# Patient Record
Sex: FEMALE | Race: WHITE | Employment: UNEMPLOYED | URBAN - METROPOLITAN AREA
[De-identification: names, ages, dates, MRNs, and addresses within clinical notes are randomized per-mention and may not be internally consistent; named-entity substitution may affect disease eponyms.]

---

## 2018-01-10 DIAGNOSIS — Z00.00 ROUTINE GENERAL MEDICAL EXAMINATION AT A HEALTH CARE FACILITY: ICD-10-CM

## 2018-01-10 NOTE — TELEPHONE ENCOUNTER
"Requested Prescriptions   Pending Prescriptions Disp Refills     norethindrone-ethinyl estradiol (JUNEL FE 1/20) 1-20 MG-MCG per tablet    Last Written Prescription Date:  11/11/2016  Last Fill Quantity: 84,  # refills: 4   Last Office Visit with [facility], P or [facility] prescribing provider:  10/14/2016   Future Office Visit:      84 tablet 4     Sig: Take 1 tablet by mouth daily    Contraceptives Protocol Failed    1/10/2018 12:18 PM       Failed - Recent or future visit with authorizing provider's specialty    Patient had office visit in the last year or has a visit in the next 30 days with authorizing provider.  See \"Patient Info\" tab in inbasket, or \"Choose Columns\" in Meds & Orders section of the refill encounter.              Passed - Patient is not a current smoker if age is 35 or older       Passed - No active pregnancy on record       Passed - No positive pregnancy test in past 12 months          "

## 2018-01-14 DIAGNOSIS — Z00.00 ROUTINE GENERAL MEDICAL EXAMINATION AT A HEALTH CARE FACILITY: ICD-10-CM

## 2018-01-15 NOTE — TELEPHONE ENCOUNTER
"Requested Prescriptions   Pending Prescriptions Disp Refills     norethindrone-ethinyl estradiol (JUNEL FE 1/20) 1-20 MG-MCG per tablet  Last Written Prescription Date:  11/11/16  Last Fill Quantity: 84,  # refills: 4   Last Office Visit with [facility], P or [facility] prescribing provider:  10/14/2016     Future Office Visit:      84 tablet 4     Sig: Take 1 tablet by mouth daily    Contraceptives Protocol Failed    1/14/2018  4:41 PM       Failed - Recent or future visit with authorizing provider's specialty    Patient had office visit in the last year or has a visit in the next 30 days with authorizing provider.  See \"Patient Info\" tab in inbasket, or \"Choose Columns\" in Meds & Orders section of the refill encounter.              Passed - Patient is not a current smoker if age is 35 or older       Passed - No active pregnancy on record       Passed - No positive pregnancy test in past 12 months          "

## 2018-01-16 RX ORDER — NORETHINDRONE ACETATE AND ETHINYL ESTRADIOL 1MG-20(21)
1 KIT ORAL DAILY
Qty: 28 TABLET | Refills: 0 | Status: SHIPPED | OUTPATIENT
Start: 2018-01-16 | End: 2020-05-22

## 2018-01-16 NOTE — TELEPHONE ENCOUNTER
Refilled x 1 month, due for an annual physical. Note sent to pharmacy.  Please call her to schedule this.   Susan Pritchett, NAY  Triage Nurse

## 2018-01-17 RX ORDER — NORETHINDRONE ACETATE AND ETHINYL ESTRADIOL 1MG-20(21)
1 KIT ORAL DAILY
Qty: 84 TABLET | Refills: 4 | OUTPATIENT
Start: 2018-01-17

## 2018-02-07 ENCOUNTER — OFFICE VISIT (OUTPATIENT)
Dept: PEDIATRICS | Facility: CLINIC | Age: 48
End: 2018-02-07
Payer: COMMERCIAL

## 2018-02-07 VITALS
BODY MASS INDEX: 23.52 KG/M2 | HEART RATE: 72 BPM | WEIGHT: 127.8 LBS | RESPIRATION RATE: 12 BRPM | TEMPERATURE: 98.1 F | HEIGHT: 62 IN | SYSTOLIC BLOOD PRESSURE: 102 MMHG | DIASTOLIC BLOOD PRESSURE: 62 MMHG | OXYGEN SATURATION: 98 %

## 2018-02-07 DIAGNOSIS — Z30.41 ENCOUNTER FOR SURVEILLANCE OF CONTRACEPTIVE PILLS: ICD-10-CM

## 2018-02-07 DIAGNOSIS — Z11.51 SCREENING FOR HUMAN PAPILLOMAVIRUS: ICD-10-CM

## 2018-02-07 DIAGNOSIS — R63.5 ABNORMAL WEIGHT GAIN: ICD-10-CM

## 2018-02-07 DIAGNOSIS — J01.90 ACUTE SINUSITIS WITH SYMPTOMS GREATER THAN 10 DAYS: ICD-10-CM

## 2018-02-07 DIAGNOSIS — Z00.00 ROUTINE GENERAL MEDICAL EXAMINATION AT A HEALTH CARE FACILITY: Primary | ICD-10-CM

## 2018-02-07 DIAGNOSIS — Z12.4 SCREENING FOR CERVICAL CANCER: ICD-10-CM

## 2018-02-07 LAB
T4 FREE SERPL-MCNC: 1.05 NG/DL (ref 0.76–1.46)
TSH SERPL DL<=0.005 MIU/L-ACNC: 1.75 MU/L (ref 0.4–4)

## 2018-02-07 PROCEDURE — 36415 COLL VENOUS BLD VENIPUNCTURE: CPT | Performed by: INTERNAL MEDICINE

## 2018-02-07 PROCEDURE — 99396 PREV VISIT EST AGE 40-64: CPT | Performed by: INTERNAL MEDICINE

## 2018-02-07 PROCEDURE — G0145 SCR C/V CYTO,THINLAYER,RESCR: HCPCS | Performed by: INTERNAL MEDICINE

## 2018-02-07 PROCEDURE — 84439 ASSAY OF FREE THYROXINE: CPT | Performed by: INTERNAL MEDICINE

## 2018-02-07 PROCEDURE — 87624 HPV HI-RISK TYP POOLED RSLT: CPT | Performed by: INTERNAL MEDICINE

## 2018-02-07 PROCEDURE — 84443 ASSAY THYROID STIM HORMONE: CPT | Performed by: INTERNAL MEDICINE

## 2018-02-07 PROCEDURE — 99214 OFFICE O/P EST MOD 30 MIN: CPT | Performed by: INTERNAL MEDICINE

## 2018-02-07 RX ORDER — ESTAZOLAM 2 MG/1
1 TABLET ORAL DAILY
Qty: 90 TABLET | Refills: 3 | Status: SHIPPED | OUTPATIENT
Start: 2018-02-07 | End: 2019-02-24

## 2018-02-07 NOTE — NURSING NOTE
"Chief Complaint   Patient presents with     Physical       Initial /62 (BP Location: Right arm, Patient Position: Chair, Cuff Size: Adult Regular)  Pulse 72  Temp 98.1  F (36.7  C) (Tympanic)  Resp 12  Ht 5' 2\" (1.575 m)  Wt 127 lb 12.8 oz (58 kg)  SpO2 98%  BMI 23.37 kg/m2 Estimated body mass index is 23.37 kg/(m^2) as calculated from the following:    Height as of this encounter: 5' 2\" (1.575 m).    Weight as of this encounter: 127 lb 12.8 oz (58 kg).  Medication Reconciliation: complete  Chelsey Montelongo CMA  "

## 2018-02-07 NOTE — MR AVS SNAPSHOT
After Visit Summary   2/7/2018    Carrie Germain    MRN: [id number]           Patient Information     Date Of Birth          1970        Visit Information        Provider Department      2/7/2018 9:00 AM Naomi Rosen MD [facility] [place]        Today's Diagnoses     Routine general medical examination at a health care facility    -  1    Acute sinusitis with symptoms greater than 10 days        Screening for cervical cancer        Screening for human papillomavirus        Encounter for surveillance of contraceptive pills        Abnormal weight gain          Care Instructions    You can take Benadryl if you have a bad flare up; this will make you sleepy. If this still causes you problems when the temperature warms up, then let me know.     Watch portion sizes. Eat a healthy, balanced diet that consists of 1/2 plate of fruits and vegetables, 1/4 plate complex carbohydrate and 1/4 plate lean protein.     You can go to a dietician, but it will not be covered under insurance. You are able to use HSA dollars for that.     Follow up for annual care or as needed       Preventive Health Recommendations  Female Ages 40 to 49    Yearly exam:     See your health care provider every year in order to  1. Review health changes.   2. Discuss preventive care.    3. Review your medicines if your doctor prescribed any.      Get a Pap test every three years (unless you have an abnormal result and your provider advises testing more often).      If you get Pap tests with HPV test, you only need to test every 5 years, unless you have an abnormal result. You do not need a Pap test if your uterus was removed (hysterectomy) and you have not had cancer.      You should be tested each year for STDs (sexually transmitted diseases), if you're at risk.       Ask your doctor if you should have a mammogram.      Have a colonoscopy (test for colon cancer) if someone in your family has had colon cancer or polyps  before age 50.       Have a cholesterol test every 5 years.       Have a diabetes test (fasting glucose) after age 45. If you are at risk for diabetes, you should have this test every 3 years.    Shots: Get a flu shot each year. Get a tetanus shot every 10 years.     Nutrition:     Eat at least 5 servings of fruits and vegetables each day.    Eat whole-grain bread, whole-wheat pasta and brown rice instead of white grains and rice.    Talk to your provider about Calcium and Vitamin D.     Lifestyle    Exercise at least 150 minutes a week (an average of 30 minutes a day, 5 days a week). This will help you control your weight and prevent disease.    Limit alcohol to one drink per day.    No smoking.     Wear sunscreen to prevent skin cancer.    See your dentist every six months for an exam and cleaning.          Follow-ups after your visit        Who to contact     If you have questions or need follow up information about today's clinic visit or your schedule please contact [facility] directly at [phone].  Normal or non-critical lab and imaging results will be communicated to you by [employer], letter or phone within 4 business days after the clinic has received the results. If you do not hear from us within 7 days, please contact the clinic through "[employer]" or phone. If you have a critical or abnormal lab result, we will notify you by phone as soon as possible.  Submit refill requests through "[employer]" or call your pharmacy and they will forward the refill request to us. Please allow 3 business days for your refill to be completed.          Additional Information About Your Visit        "[employer]" Information     "[employer]" gives you secure access to your electronic health record. If you see a primary care provider, you can also send messages to your care team and make appointments. If you have questions, please call your primary care clinic.  If you do not have a primary care provider, please call [phone]  "and they will assist you.        Care EveryWhere ID     This is your Care EveryWhere ID. This could be used by other organizations to access your [facility] medical records  [id number]        Your Vitals Were     Pulse Temperature Respirations Height Pulse Oximetry BMI (Body Mass Index)    72 98.1  F (36.7  C) (Tympanic) 12 5' 2\" (1.575 m) 98% 23.37 kg/m2       Blood Pressure from Last 3 Encounters:   02/07/18 102/62   10/14/16 100/70   09/13/16 90/52    Weight from Last 3 Encounters:   02/07/18 127 lb 12.8 oz (58 kg)   10/14/16 122 lb (55.3 kg)   09/13/16 121 lb 1.6 oz (54.9 kg)              We Performed the Following     HPV High Risk Types DNA Cervical     Pap imaged thin layer screen with HPV - recommended age 30 - 65     T4 FREE     TSH          Today's Medication Changes          These changes are accurate as of 2/7/18  9:44 AM.  If you have any questions, ask your nurse or doctor.               Start taking these medicines.        Dose/Directions    amoxicillin-clavulanate 875-125 MG per tablet   Commonly known as:  AUGMENTIN   Used for:  Acute sinusitis with symptoms greater than 10 days   Started by:  Naomi Rosen MD        Dose:  1 tablet   Take 1 tablet by mouth 2 times daily   Quantity:  20 tablet   Refills:  0       MICROGESTIN 1-20 MG-MCG per tablet   Used for:  Encounter for surveillance of contraceptive pills   Generic drug:  norethindrone-ethinyl estradiol   Started by:  Naomi Rosen MD        Dose:  1 tablet   Take 1 tablet by mouth daily   Quantity:  90 tablet   Refills:  3            Where to get your medicines      These medications were sent to [id number] - [place] - [address]  [address], [place] [address]     Phone:  [phone]     MICROGESTIN 1-20 MG-MCG per tablet         Some of these will need a paper prescription and others can be bought over the counter.  Ask your nurse if you have " questions.     Bring a paper prescription for each of these medications     amoxicillin-clavulanate 875-125 MG per tablet                Primary Care Provider Office Phone # Fax #    Naomi Rosen [phone] [address] [place] [address]        Equal Access to Services     [facility]: Hadii aad ku hadasho Soomaali, waaxda luqadaha, qaybta kaalmada adeegyada, waxay ambikain hayalecian daysi delvallehanypolina gardner. So [facility] [phone].    ATENCIÓN: Si habla español, tiene a patel disposición servicios gratuitos de asistencia lingüística. [facility] [phone].    We comply with applicable federal civil rights laws and [place] laws. We do not discriminate on the basis of race, color, national origin, age, disability, sex, sexual orientation, or gender identity.            Thank you!     Thank you for choosing [facility] [place]  for your care. Our goal is always to provide you with excellent care. Hearing back from our patients is one way we can continue to improve our services. Please take a few minutes to complete the written survey that you may receive in the mail after your visit with us. Thank you!             Your Updated Medication List - Protect others around you: Learn how to safely use, store and throw away your medicines at www.disposemymeds.org.          This list is accurate as of 2/7/18  9:44 AM.  Always use your most recent med list.                   Brand Name Dispense Instructions for use Diagnosis    amoxicillin-clavulanate 875-125 MG per tablet    AUGMENTIN    20 tablet    Take 1 tablet by mouth 2 times daily    Acute sinusitis with symptoms greater than 10 days       cetirizine 10 MG tablet    zyrTEC    30 tablet    Take 1 tablet (10 mg) by mouth every evening        MICROGESTIN 1-20 MG-MCG per tablet   Generic drug:  norethindrone-ethinyl estradiol     90 tablet    Take 1 tablet by mouth daily    Encounter for surveillance of contraceptive pills        norethindrone-ethinyl estradiol 1-20 MG-MCG per tablet    JUNEL FE 1/20    28 tablet    Take 1 tablet by mouth daily    Routine general medical examination at a health care facility

## 2018-02-07 NOTE — PROGRESS NOTES
"   SUBJECTIVE:   CC: Carrie Germain is an 47 year old woman who presents for preventive health visit.     Carrie presents to the clinic for her annual physical examination. Reports she has a cold which transitioned into chest congestion, cough and chills. Sx have lasted 8 days and patient reports she is staying the same. Endorses facial pain/pressure and shortness of breath, but denies rhinorrhea . Sx wax and wane day by day.     Patient reports she was out of birth control and was given a different birth control without microgestin the past. She reported with this past pill pack she was given a different pill and \"felt like I was pregnant\".  She wishes to use microgestin only.      Patient states she has skin redness and itching with scratching or cold that  Zyrtec  has helped in the past. She reports the Zyrtec has not been helping as much in the last month.     BP in clinic was 102/62; weight was 127 lbs. Notes she has been exercising on the treadmill and aerobics 2-4x a week and does eats healthy; she is wondering if there are thyroid issues.     Physical   Annual:     Getting at least 3 servings of Calcium per day::  Yes    Bi-annual eye exam::  Yes    Dental care twice a year::  Yes    Sleep apnea or symptoms of sleep apnea::  None    Diet::  Regular (no restrictions)    Frequency of exercise::  2-3 days/week    Duration of exercise::  Greater than 60 minutes    Taking medications regularly::  No    Barriers to taking medications::  Other (Last refill of birth control pills she got a different kind than she had been getting and would like to note to the pharmacy that she only wants \"microgestin\" )    Medication side effects::  None    Additional concerns today::  YES (is on day 8 of a cold.)              RESPIRATORY SYMPTOMS    Duration: 8 days    Description  nasal congestion, rhinorrhea, sore throat, facial pain/pressure, cough, wheezing, chills, headache and hoarse voice after coughing a lot, fatigue is " very bad    Severity: moderate    Accompanying signs and symptoms: None    History (predisposing factors):  Has been sick on and off for months, passing URI back and forth to     Precipitating or alleviating factors: None    Therapies tried and outcome:  nyquil at night to help with coughing/sleep      Today's PHQ-2 Score:   PHQ-2 ( 1999 Pfizer) 2/7/2018   Q1: Little interest or pleasure in doing things 0   Q2: Feeling down, depressed or hopeless 0   PHQ-2 Score 0   Q1: Little interest or pleasure in doing things Not at all   Q2: Feeling down, depressed or hopeless Not at all   PHQ-2 Score 0       Abuse: Current or Past(Physical, Sexual or Emotional)- No  Do you feel safe in your environment - Yes    Social History   Substance Use Topics     Smoking status: Never Smoker     Smokeless tobacco: Never Used     Alcohol use Yes     Alcohol Use 2/7/2018   If you drink alcohol, do you typically have greater than 3 drinks per day OR greater than 7 drinks per week?   No       Reviewed orders with patient.  Reviewed health maintenance and updated orders accordingly - Yes  Labs reviewed in EPIC  BP Readings from Last 3 Encounters:   02/07/18 102/62   10/14/16 100/70   09/13/16 90/52    Wt Readings from Last 3 Encounters:   02/07/18 58 kg (127 lb 12.8 oz)   10/14/16 55.3 kg (122 lb)   09/13/16 54.9 kg (121 lb 1.6 oz)                  There is no problem list on file for this patient.    Past Surgical History:   Procedure Laterality Date     LEEP TX, CERVICAL      1987       Social History   Substance Use Topics     Smoking status: Never Smoker     Smokeless tobacco: Never Used     Alcohol use Yes     Family History   Problem Relation Age of Onset     Hypertension Mother      Hyperlipidemia Mother      Prostate Cancer Father      Breast Cancer Maternal Grandmother      50's     Coronary Artery Disease Paternal Grandmother      50's     Other Cancer Other      bone cancer     Hyperlipidemia Sister      Hypertension Sister   "        Current Outpatient Prescriptions   Medication Sig Dispense Refill     norethindrone-ethinyl estradiol (JUNEL FE 1/20) 1-20 MG-MCG per tablet Take 1 tablet by mouth daily 28 tablet 0     cetirizine (ZYRTEC) 10 MG tablet Take 1 tablet (10 mg) by mouth every evening 30 tablet 1     Allergies   Allergen Reactions     Doxycycline Palpitations     Hives          Patient under age 50, mutual decision reflected in health maintenance.      Pertinent mammograms are reviewed under the imaging tab.  History of abnormal Pap smear: Last 3 Pap Results: No results found for: PAP    Reviewed and updated as needed this visit by clinical staff         Reviewed and updated as needed this visit by Provider            Review of Systems  C: NEGATIVE for fever, chills, change in weight  I: NEGATIVE for worrisome rashes, moles or lesions  E: NEGATIVE for vision changes or irritation  ENT: NEGATIVE for ear, mouth and throat problems  R: NEGATIVE for significant cough or SOB  B: NEGATIVE for masses, tenderness or discharge  CV: NEGATIVE for chest pain, palpitations or peripheral edema  GI: NEGATIVE for nausea, abdominal pain, heartburn, or change in bowel habits  : NEGATIVE for unusual urinary or vaginal symptoms. Periods are regular.  M: NEGATIVE for significant arthralgias or myalgia  N: NEGATIVE for weakness, dizziness or paresthesias  P: NEGATIVE for changes in mood or affect     This document serves as a record of the services and decisions personally performed and made by Naomi Rosen MD. It was created on her behalf by Alicia Napoles, a trained medical scribe. The creation of this document is based the provider's statements to the medical scribe.    Alicia Napoles February 7, 2018 9:23 AM  OBJECTIVE:   /62 (BP Location: Right arm, Patient Position: Chair, Cuff Size: Adult Regular)  Pulse 72  Temp 98.1  F (36.7  C) (Tympanic)  Resp 12  Ht 5' 2\" (1.575 m)  Wt 127 lb 12.8 oz (58 kg)  SpO2 98%  BMI 23.37 " kg/m2  Physical Exam  GENERAL: healthy, alert and no distress  EYES: Eyes grossly normal to inspection, PERRL and conjunctivae and sclerae normal  HENT: ear canals and TM's normal, nose and mouth without ulcers or lesions  NECK: no adenopathy, no asymmetry, masses, or scars and thyroid normal to palpation  RESP: lungs clear to auscultation - no rales, rhonchi or wheezes  BREAST: normal without masses, tenderness or nipple discharge and no palpable axillary masses or adenopathy  CV: regular rate and rhythm, normal S1 S2, no S3 or S4, no murmur, click or rub, no peripheral edema   ABDOMEN: soft, nontender, no hepatosplenomegaly, no masses and bowel sounds normal   (female): normal female external genitalia, normal urethral meatus, vaginal mucosa pink, moist, well rugated, and normal cervix/adnexa/uterus without masses or discharge  MS: no gross musculoskeletal defects noted, no edema  SKIN: no suspicious lesions or rashes  NEURO: Normal strength and tone, mentation intact and speech normal  PSYCH: mentation appears normal, affect normal/bright    ASSESSMENT/PLAN:   (Z00.00) Routine general medical examination at a health care facility  (primary encounter diagnosis)  -- immunizations utd  -- schedule mammogram   -- lipid panel utd   -- discussed healthy diet and nutrition; encouraged continuning exercise routine    -- Benadryl for inflammation flare ups; advised patient it will make her drowsy  -- discussed with patient there are limited options for treatment with antihistamines due to drowsiness side effect; if sx are not improving when the temperature increases, will send to allergist        (Z12.4) Screening for cervical cancer  -- pap today   Plan: Pap imaged thin layer screen with HPV -         recommended age 30 - 65            (Z11.51) Screening for human papillomavirus  -- pap today   Plan: HPV High Risk Types DNA Cervical      (Z30.41) Encounter for surveillance of contraceptive pills  -- refill  "medication  -- discussed with patient I wrote the Rx for brand name only   Plan: MICROGESTIN 1-20 MG-MCG per tablet            (R63.5) Abnormal weight gain  -- discussed watching portion sizes and trying to find hidden calories   -- encouraged continued exercise; discussed with patient she may see dietician if she wishes, but will not be covered under insurance; can use HSA dollars   -- will recheck thyroid labs today   Plan: TSH, T4 FREE    (J01.90) Acute sinusitis with symptoms greater than 10 days  -- discussed with patient I suspect a viral infection has moved into sinuses  -- will treat with Augmentin if not better in 3-5 days or worsening; reviewed course and side effects of antibiotics-written prescription given for patient to fill if needed.    -- advised rest, fluids and Ibuprofen for pain; follow up if not improving   Plan: amoxicillin-clavulanate (AUGMENTIN) 875-125 MG         per tablet            Follow up for annual care or as needed       COUNSELING:  Reviewed preventive health counseling, as reflected in patient instructions       Regular exercise       Healthy diet/nutrition       Vision screening         reports that she has never smoked. She has never used smokeless tobacco.    Estimated body mass index is 21.96 kg/(m^2) as calculated from the following:    Height as of 10/14/16: 5' 2.5\" (1.588 m).    Weight as of 10/14/16: 122 lb (55.3 kg).       Counseling Resources:  ATP IV Guidelines  Pooled Cohorts Equation Calculator  Breast Cancer Risk Calculator  FRAX Risk Assessment  ICSI Preventive Guidelines  Dietary Guidelines for Americans, 2010  USDA's MyPlate  ASA Prophylaxis  Lung CA Screening    The information in this document, created by the medical scribe for me, accurately reflects the services I personally performed and the decisions made by me. I have reviewed and approved this document for accuracy prior to leaving the patient care area.  Naomi Rosen MD  [facility] " [place]

## 2018-02-07 NOTE — PATIENT INSTRUCTIONS
You can take Benadryl if you have a bad flare up; this will make you sleepy. If this still causes you problems when the temperature warms up, then let me know.     Watch portion sizes. Eat a healthy, balanced diet that consists of 1/2 plate of fruits and vegetables, 1/4 plate complex carbohydrate and 1/4 plate lean protein.     You can go to a dietician, but it will not be covered under insurance. You are able to use HSA dollars for that.     Follow up for annual care or as needed       Preventive Health Recommendations  Female Ages 40 to 49    Yearly exam:     See your health care provider every year in order to  1. Review health changes.   2. Discuss preventive care.    3. Review your medicines if your doctor prescribed any.      Get a Pap test every three years (unless you have an abnormal result and your provider advises testing more often).      If you get Pap tests with HPV test, you only need to test every 5 years, unless you have an abnormal result. You do not need a Pap test if your uterus was removed (hysterectomy) and you have not had cancer.      You should be tested each year for STDs (sexually transmitted diseases), if you're at risk.       Ask your doctor if you should have a mammogram.      Have a colonoscopy (test for colon cancer) if someone in your family has had colon cancer or polyps before age 50.       Have a cholesterol test every 5 years.       Have a diabetes test (fasting glucose) after age 45. If you are at risk for diabetes, you should have this test every 3 years.    Shots: Get a flu shot each year. Get a tetanus shot every 10 years.     Nutrition:     Eat at least 5 servings of fruits and vegetables each day.    Eat whole-grain bread, whole-wheat pasta and brown rice instead of white grains and rice.    Talk to your provider about Calcium and Vitamin D.     Lifestyle    Exercise at least 150 minutes a week (an average of 30 minutes a day, 5 days a week). This will help you control your  weight and prevent disease.    Limit alcohol to one drink per day.    No smoking.     Wear sunscreen to prevent skin cancer.    See your dentist every six months for an exam and cleaning.

## 2018-02-09 LAB
COPATH REPORT: NORMAL
PAP: NORMAL

## 2018-02-10 ENCOUNTER — HEALTH MAINTENANCE LETTER (OUTPATIENT)
Age: 48
End: 2018-02-10

## 2018-02-13 LAB
FINAL DIAGNOSIS: NORMAL
HPV HR 12 DNA CVX QL NAA+PROBE: NEGATIVE
HPV16 DNA SPEC QL NAA+PROBE: NEGATIVE
HPV18 DNA SPEC QL NAA+PROBE: NEGATIVE
SPECIMEN DESCRIPTION: NORMAL
SPECIMEN SOURCE CVX/VAG CYTO: NORMAL

## 2018-08-16 ENCOUNTER — TELEPHONE (OUTPATIENT)
Dept: PEDIATRICS | Facility: CLINIC | Age: 48
End: 2018-08-16

## 2018-08-16 NOTE — TELEPHONE ENCOUNTER
8/16/2018    Attempt 1    Contacted patient in regards to scheduling VIP mammogram  Message on voicemail         Comments:       Outreach   Yolanda FITZGERALD

## 2019-03-01 DIAGNOSIS — Z30.41 ENCOUNTER FOR SURVEILLANCE OF CONTRACEPTIVE PILLS: ICD-10-CM

## 2019-03-01 NOTE — TELEPHONE ENCOUNTER
Pt is calling the last script they only gave her 21 pills, they would not fill for the 28. Pt said she needs the additional pills and another month. She wants the Brand name only. Roger filled it with generic.  Please call when script is done.

## 2019-03-04 RX ORDER — ESTAZOLAM 2 MG/1
1 TABLET ORAL DAILY
Refills: 0
Start: 2019-03-04

## 2019-04-02 ENCOUNTER — OFFICE VISIT (OUTPATIENT)
Dept: PEDIATRICS | Facility: CLINIC | Age: 49
End: 2019-04-02
Payer: COMMERCIAL

## 2019-04-02 VITALS
BODY MASS INDEX: 23.96 KG/M2 | WEIGHT: 130.2 LBS | TEMPERATURE: 99.2 F | SYSTOLIC BLOOD PRESSURE: 112 MMHG | DIASTOLIC BLOOD PRESSURE: 78 MMHG | HEIGHT: 62 IN | OXYGEN SATURATION: 99 % | HEART RATE: 67 BPM

## 2019-04-02 DIAGNOSIS — Z12.31 VISIT FOR SCREENING MAMMOGRAM: ICD-10-CM

## 2019-04-02 DIAGNOSIS — Z11.4 SCREENING FOR HIV (HUMAN IMMUNODEFICIENCY VIRUS): ICD-10-CM

## 2019-04-02 DIAGNOSIS — Z30.41 ENCOUNTER FOR SURVEILLANCE OF CONTRACEPTIVE PILLS: ICD-10-CM

## 2019-04-02 DIAGNOSIS — Z00.00 ROUTINE GENERAL MEDICAL EXAMINATION AT A HEALTH CARE FACILITY: Primary | ICD-10-CM

## 2019-04-02 PROCEDURE — 99396 PREV VISIT EST AGE 40-64: CPT | Performed by: INTERNAL MEDICINE

## 2019-04-02 RX ORDER — ESTAZOLAM 2 MG/1
1 TABLET ORAL DAILY
Qty: 90 TABLET | Refills: 3 | Status: SHIPPED | OUTPATIENT
Start: 2019-04-02 | End: 2020-05-22

## 2019-04-02 ASSESSMENT — ENCOUNTER SYMPTOMS
MYALGIAS: 0
CONSTIPATION: 0
WEAKNESS: 0
SHORTNESS OF BREATH: 0
CHILLS: 0
ARTHRALGIAS: 0
COUGH: 1
DIARRHEA: 0
DIZZINESS: 0
HEARTBURN: 0
NAUSEA: 0
HEMATOCHEZIA: 0
FEVER: 0
HEADACHES: 0
NERVOUS/ANXIOUS: 0
JOINT SWELLING: 0
FREQUENCY: 0
BREAST MASS: 0
EYE PAIN: 0
DYSURIA: 0
PARESTHESIAS: 0
ABDOMINAL PAIN: 0
PALPITATIONS: 0
HEMATURIA: 0
SORE THROAT: 0

## 2019-04-02 ASSESSMENT — MIFFLIN-ST. JEOR: SCORE: 1178.83

## 2019-04-02 ASSESSMENT — ANXIETY QUESTIONNAIRES
GAD7 TOTAL SCORE: 0
2. NOT BEING ABLE TO STOP OR CONTROL WORRYING: NOT AT ALL
7. FEELING AFRAID AS IF SOMETHING AWFUL MIGHT HAPPEN: NOT AT ALL
5. BEING SO RESTLESS THAT IT IS HARD TO SIT STILL: NOT AT ALL
6. BECOMING EASILY ANNOYED OR IRRITABLE: NOT AT ALL
3. WORRYING TOO MUCH ABOUT DIFFERENT THINGS: NOT AT ALL
IF YOU CHECKED OFF ANY PROBLEMS ON THIS QUESTIONNAIRE, HOW DIFFICULT HAVE THESE PROBLEMS MADE IT FOR YOU TO DO YOUR WORK, TAKE CARE OF THINGS AT HOME, OR GET ALONG WITH OTHER PEOPLE: NOT DIFFICULT AT ALL
1. FEELING NERVOUS, ANXIOUS, OR ON EDGE: NOT AT ALL

## 2019-04-02 ASSESSMENT — PATIENT HEALTH QUESTIONNAIRE - PHQ9
SUM OF ALL RESPONSES TO PHQ QUESTIONS 1-9: 0
5. POOR APPETITE OR OVEREATING: NOT AT ALL

## 2019-04-02 NOTE — PATIENT INSTRUCTIONS
Tell them when you are scheduling the mammogram and let them know you want the 3D mammogram. Can call insurance and ask for coverage if you wish before.     If periods stop completley on the pill let me know and we may try stopping the pill earlier.       Preventive Health Recommendations  Female Ages 40 to 49    Yearly exam:     See your health care provider every year in order to  1. Review health changes.   2. Discuss preventive care.    3. Review your medicines if your doctor prescribed any.      Get a Pap test every three years (unless you have an abnormal result and your provider advises testing more often).      If you get Pap tests with HPV test, you only need to test every 5 years, unless you have an abnormal result. You do not need a Pap test if your uterus was removed (hysterectomy) and you have not had cancer.      You should be tested each year for STDs (sexually transmitted diseases), if you're at risk.     Ask your doctor if you should have a mammogram.      Have a colonoscopy (test for colon cancer) if someone in your family has had colon cancer or polyps before age 50.       Have a cholesterol test every 5 years.       Have a diabetes test (fasting glucose) after age 45. If you are at risk for diabetes, you should have this test every 3 years.    Shots: Get a flu shot each year. Get a tetanus shot every 10 years.     Nutrition:     Eat at least 5 servings of fruits and vegetables each day.    Eat whole-grain bread, whole-wheat pasta and brown rice instead of white grains and rice.    Get adequate Calcium and Vitamin D.      Lifestyle    Exercise at least 150 minutes a week (an average of 30 minutes a day, 5 days a week). This will help you control your weight and prevent disease.    Limit alcohol to one drink per day.    No smoking.     Wear sunscreen to prevent skin cancer.    See your dentist every six months for an exam and cleaning.

## 2019-04-02 NOTE — PROGRESS NOTES
SUBJECTIVE:   CC: Carrie Germain is an 48 year old woman who presents for preventive health visit.     Physical   Annual:     Getting at least 3 servings of Calcium per day:  Yes    Bi-annual eye exam:  Yes    Dental care twice a year:  Yes    Sleep apnea or symptoms of sleep apnea:  None    Diet:  Regular (no restrictions)    Frequency of exercise:  2-3 days/week    Duration of exercise:  30-45 minutes    Taking medications regularly:  Yes    Medication side effects:  None    Additional concerns today:  No    PHQ-2 Total Score: 0    Recently got a cold yesterday. Otherwise things are going well for her. Wondering about 3D mammogram.           Today's PHQ-2 Score:   PHQ-2 ( 1999 [employer]) 4/2/2019   Q1: Little interest or pleasure in doing things 0   Q2: Feeling down, depressed or hopeless 0   PHQ-2 Score 0   Q1: Little interest or pleasure in doing things Not at all   Q2: Feeling down, depressed or hopeless Not at all   PHQ-2 Score 0       Abuse: Current or Past(Physical, Sexual or Emotional)- No  Do you feel safe in your environment? Yes    Social History     Tobacco Use     Smoking status: Never Smoker     Smokeless tobacco: Never Used   Substance Use Topics     Alcohol use: Yes     Alcohol Use 4/2/2019   If you drink alcohol do you typically have greater than 3 drinks per day OR greater than 7 drinks per week? No   No flowsheet data found.    Reviewed orders with patient.  Reviewed health maintenance and updated orders accordingly - Yes  Labs reviewed in EPIC  BP Readings from Last 3 Encounters:   04/02/19 112/78   02/07/18 102/62   10/14/16 100/70    Wt Readings from Last 3 Encounters:   04/02/19 59.1 kg (130 lb 3.2 oz)   02/07/18 58 kg (127 lb 12.8 oz)   10/14/16 55.3 kg (122 lb)                  There is no problem list on file for this patient.    Past Surgical History:   Procedure Laterality Date     LEEP TX, CERVICAL      1987       Social History     Tobacco Use     Smoking status: Never Smoker      Smokeless tobacco: Never Used   Substance Use Topics     Alcohol use: Yes     Family History   Problem Relation Age of Onset     Hypertension Mother      Hyperlipidemia Mother      Prostate Cancer Father      Breast Cancer Maternal Grandmother         50's     Coronary Artery Disease Paternal Grandmother         50's     Other Cancer Other         bone cancer     Hyperlipidemia Sister      Hypertension Sister          Current Outpatient Medications   Medication Sig Dispense Refill     cetirizine (ZYRTEC) 10 MG tablet Take 1 tablet (10 mg) by mouth every evening 30 tablet 1     MICROGESTIN 1-20 MG-MCG tablet TAKE ONE TABLET BY MOUTH EVERY DAY 28 tablet 0     amoxicillin-clavulanate (AUGMENTIN) 875-125 MG per tablet Take 1 tablet by mouth 2 times daily (Patient not taking: Reported on 4/2/2019) 20 tablet 0     norethindrone-ethinyl estradiol (JUNEL FE 1/20) 1-20 MG-MCG per tablet Take 1 tablet by mouth daily (Patient not taking: Reported on 4/2/2019) 28 tablet 0     Allergies   Allergen Reactions     Doxycycline Palpitations     Hives          Mammogram Screening: Patient under age 50, mutual decision reflected in health maintenance.      Pertinent mammograms are reviewed under the imaging tab.  History of abnormal Pap smear:   Last 3 Pap Results:   PAP (no units)   Date Value   02/07/2018 NIL     PAP / HPV Latest Ref Rng & Units 2/7/2018   PAP - NIL   HPV 16 DNA NEG:Negative Negative   HPV 18 DNA NEG:Negative Negative   OTHER HR HPV NEG:Negative Negative     Reviewed and updated as needed this visit by clinical staff  Tobacco  Allergies  Meds         Reviewed and updated as needed this visit by Provider            Review of Systems   Constitutional: Negative for chills and fever.   HENT: Positive for congestion. Negative for ear pain, hearing loss and sore throat.    Eyes: Negative for pain and visual disturbance.   Respiratory: Positive for cough. Negative for shortness of breath.    Cardiovascular: Negative for  "chest pain, palpitations and peripheral edema.   Gastrointestinal: Negative for abdominal pain, constipation, diarrhea, heartburn, hematochezia and nausea.   Breasts:  Negative for tenderness, breast mass and discharge.   Genitourinary: Negative for dysuria, frequency, genital sores, hematuria, pelvic pain, urgency, vaginal bleeding and vaginal discharge.   Musculoskeletal: Negative for arthralgias, joint swelling and myalgias.   Skin: Negative for rash.   Neurological: Negative for dizziness, weakness, headaches and paresthesias.   Psychiatric/Behavioral: Negative for mood changes. The patient is not nervous/anxious.        This document serves as a record of the services and decisions personally performed and made by Naomi Rosen MD. It was created on her behalf by Alicia Napoles, a trained medical scribe. The creation of this document is based the provider's statements to the medical scribe.    Alicia Napoles April 2, 2019 3:58 PM   OBJECTIVE:   /78 (BP Location: Right arm, Patient Position: Chair, Cuff Size: Adult Regular)   Pulse 67   Temp 99.2  F (37.3  C) (Oral)   Ht 1.575 m (5' 2\")   Wt 59.1 kg (130 lb 3.2 oz)   SpO2 99%   BMI 23.81 kg/m    Physical Exam  GENERAL: healthy, alert and no distress  EYES: Eyes grossly normal to inspection, PERRL and conjunctivae and sclerae normal  HENT: ear canals and TM's normal, nose and mouth without ulcers or lesions  NECK: no adenopathy, no asymmetry, masses, or scars and thyroid normal to palpation  RESP: lungs clear to auscultation - no rales, rhonchi or wheezes  BREAST: normal without masses, tenderness or nipple discharge and no palpable axillary masses or adenopathy  CV: regular rate and rhythm, normal S1 S2, no S3 or S4, no murmur, click or rub, no peripheral edema   ABDOMEN: soft, nontender, no hepatosplenomegaly, no masses and bowel sounds normal  MS: no gross musculoskeletal defects noted, no edema  SKIN: no suspicious lesions or rashes  NEURO: " "Normal strength and tone, mentation intact and speech normal  PSYCH: mentation appears normal, affect normal/bright    Diagnostic Test Results:  No results found for this or any previous visit (from the past 24 hour(s)).    ASSESSMENT/PLAN:   (Z00.00) Routine general medical examination at a health care facility  (primary encounter diagnosis)  -- imm utd  -- pap utd   -- due for mammogram; as patient has dense breasts on mammogram would recommend 3D; advised patient to ask for 3D mammogram   -- encouraged regular exercise and balanced diet     (Z12.31) Visit for screening mammogram  -- due for mammogram; as patient has dense breasts on mammogram would recommend 3D; advised patient to ask for 3D mammogram   Plan: MA SCREENING DIGITAL BILAT - Future  (s+30)      (Z11.4) Screening for HIV (human immunodeficiency virus)  -- will take with routine blood work     (Z30.41) Encounter for surveillance of contraceptive pills  -- doing well on the pill   -- needs brand name medication; is on script   -- advised patient we will wait until 51 to come off and see if menopausal   Plan: MICROGESTIN 1-20 MG-MCG tablet        Follow up for annual care or as needed         COUNSELING:  Reviewed preventive health counseling, as reflected in patient instructions       Regular exercise       Healthy diet/nutrition       Contraception       HIV screeninx in teen years, 1x in adult years, and at intervals if high risk    BP Readings from Last 1 Encounters:   19 112/78     Estimated body mass index is 23.81 kg/m  as calculated from the following:    Height as of this encounter: 1.575 m (5' 2\").    Weight as of this encounter: 59.1 kg (130 lb 3.2 oz).       reports that  has never smoked. she has never used smokeless tobacco.      Counseling Resources:  ATP IV Guidelines  Pooled Cohorts Equation Calculator  Breast Cancer Risk Calculator  FRAX Risk Assessment  ICSI Preventive Guidelines  Dietary Guidelines for Americans, " 2010  USDA's MyPlate  ASA Prophylaxis  Lung CA Screening    The information in this document, created by the medical scribe for me, accurately reflects the services I personally performed and the decisions made by me. I have reviewed and approved this document for accuracy prior to leaving the patient care area.  Naomi Rosen MD  [facility]

## 2019-04-03 ASSESSMENT — ANXIETY QUESTIONNAIRES: GAD7 TOTAL SCORE: 0

## 2019-04-15 ENCOUNTER — ANCILLARY PROCEDURE (OUTPATIENT)
Dept: MAMMOGRAPHY | Facility: CLINIC | Age: 49
End: 2019-04-15
Attending: INTERNAL MEDICINE
Payer: COMMERCIAL

## 2019-04-15 DIAGNOSIS — Z12.31 VISIT FOR SCREENING MAMMOGRAM: ICD-10-CM

## 2019-04-15 PROCEDURE — 77063 BREAST TOMOSYNTHESIS BI: CPT | Mod: TC

## 2019-04-15 PROCEDURE — 77067 SCR MAMMO BI INCL CAD: CPT | Mod: TC

## 2019-11-02 ENCOUNTER — HEALTH MAINTENANCE LETTER (OUTPATIENT)
Age: 49
End: 2019-11-02

## 2020-05-21 ENCOUNTER — TRANSFERRED RECORDS (OUTPATIENT)
Dept: HEALTH INFORMATION MANAGEMENT | Facility: CLINIC | Age: 50
End: 2020-05-21

## 2020-05-22 ENCOUNTER — VIRTUAL VISIT (OUTPATIENT)
Dept: PEDIATRICS | Facility: CLINIC | Age: 50
End: 2020-05-22
Payer: COMMERCIAL

## 2020-05-22 DIAGNOSIS — R10.11 RUQ ABDOMINAL PAIN: Primary | ICD-10-CM

## 2020-05-22 DIAGNOSIS — Z12.31 ENCOUNTER FOR SCREENING MAMMOGRAM FOR BREAST CANCER: ICD-10-CM

## 2020-05-22 DIAGNOSIS — Z30.41 ENCOUNTER FOR SURVEILLANCE OF CONTRACEPTIVE PILLS: ICD-10-CM

## 2020-05-22 PROCEDURE — 99214 OFFICE O/P EST MOD 30 MIN: CPT | Mod: 95 | Performed by: NURSE PRACTITIONER

## 2020-05-22 RX ORDER — ESTAZOLAM 2 MG/1
1 TABLET ORAL DAILY
Qty: 90 TABLET | Refills: 3 | Status: SHIPPED | OUTPATIENT
Start: 2020-05-22

## 2020-05-22 ASSESSMENT — ENCOUNTER SYMPTOMS
DIARRHEA: 0
HEMATOCHEZIA: 0
VOMITING: 0
BACK PAIN: 1
HEMATURIA: 0
ABDOMINAL DISTENTION: 0
FEVER: 0
SHORTNESS OF BREATH: 0
COUGH: 0
FATIGUE: 0
CONSTIPATION: 0
DYSURIA: 0
CHEST TIGHTNESS: 0
NAUSEA: 0
ABDOMINAL PAIN: 1

## 2020-05-22 NOTE — PATIENT INSTRUCTIONS
"It was nice to \"meet\" you!    I have ordered a CT scan of your abdomen and chest for further investigation as to what might be causing your pain. Please call [facility] in [place] to schedule this test.  Radiology scheduling [phone]     After I review the results of your CT, I will be in contact with you either over MyChart or phone, depending on the results.     Please schedule an appointment to see [facility]ING[facility] in [place]. Their phone number is [phone]. If you are unable to be seen in a reasonable amount of time (3-4 weeks), please let me know.     Please schedule an appointment to return to clinic for routine preventative exam something this summer/fall. I have entered orders for a mammogram so you can have this completed at any time. Call our clinic to schedule this.   "

## 2020-05-22 NOTE — PROGRESS NOTES
"Carrie Germain is a 50 year old female who is being evaluated via a billable telephone visit.      General consent for services was read to patient. Did patient consent? Yes     The patient has been notified of following:     \"This telephone visit will be conducted via a call between you and your physician/provider. We have found that certain health care needs can be provided without the need for a physical exam.  This service lets us provide the care you need with a short phone conversation.  If a prescription is necessary we can send it directly to your pharmacy.  If lab work is needed we can place an order for that and you can then stop by our lab to have the test done at a later time.    Telephone visits are billed at different rates depending on your insurance coverage. During this emergency period, for some insurers they may be billed the same as an in-person visit.  Please reach out to your insurance provider with any questions.    If during the course of the call the physician/provider feels a telephone visit is not appropriate, you will not be charged for this service.\"    Patient has given verbal consent for Telephone visit?  Yes    What phone number would you like to be contacted at?  [phone]    How would you like to obtain your AVS? MyChart    Subjective     Carrie Germain is a 50 year old female who presents via phone visit today for the following health issues:    Patient here to establish care. She is presenting after visit to  on 5/21/20 and has concerns surrounding possible gallbladder issues.     DREW Butler presented to  on 5/21/2020 for evaluation of right lower chest/RUQ abdominal pain that had been ongoing since December 2019. She can describe the day pain started with detail, about 2 weeks before Jeanette. Acute onset while in the kitchen causing her to fall to the ground. Initially thought she pulled a muscle. Pain present to a certain degree since that time, began to radiate " into her back periodically so she began to seek treatment with chiropractor. During a recent appt. with Chiro, patient was noted to have a hard time laying flat due to pain, Chiro thought there might be gallbladder involvement given location of pain in RUQ. Severe pain of 10/10 is what caused her to present to UR.    Troponin and D-dimer were negative. The remainder of the labs were unremarkable. Chest x-ray was negative. Abdominal US was normal. No back or flank pain so CT to rule out kidney stones was deferred. There was no clear etiology discovered and patient was recommended to f/u with PCP for further evaluation, possible CT or HIDA scan.     Carrie reports today that her pain has persisted since discharge, described as nagging, 2/10. Located near bra-line in RUQ abdomen. No other abdominal pain. Tender to touch, worse with certain movements such as raising arms above head and lifting. Pain wraps around to right shoulder blade. Denies chest pain. Does not involve breast tissue. Denies fevers. No associated with eating or bowel movements. Denies n/v/d. Is having normal bowel movements. Denies blood in stool. Denies dysuria and hemturia. Denies pelvic pain. Denies history of kidney stones. Denies daily use of NSAIDs prior to onset of pain but has been using regularly since January for pain management, 2-3 weeks ago started setting an alarm to remember to take medication every 4 hours, has been using ibuprofen 400 mg every 4 hours as needed for pain control. ETOH use: 4 drinks/week.    Diagnosed with diverticulosis and colitis in 2005, stable for several years.       Past Medical History:   Diagnosis Date     Colitis 2005     Diverticulosis 2005     Past Surgical History:   Procedure Laterality Date     LEEP TX, CERVICAL      1987     Family History   Problem Relation Age of Onset     Hypertension Mother      Hyperlipidemia Mother      Prostate Cancer Father      Breast Cancer Maternal Grandmother         50's      Coronary Artery Disease Paternal Grandmother         50's     Other Cancer Other         bone cancer     Hyperlipidemia Sister      Hypertension Sister      Social History     Socioeconomic History     Marital status:      Spouse name: Not on file     Number of children: Not on file     Years of education: Not on file     Highest education level: Not on file   Occupational History     Not on file   Social Needs     Financial resource strain: Not on file     Food insecurity     Worry: Not on file     Inability: Not on file     Transportation needs     Medical: Not on file     Non-medical: Not on file   Tobacco Use     Smoking status: Never Smoker     Smokeless tobacco: Never Used   Substance and Sexual Activity     Alcohol use: Yes     Drug use: No     Sexual activity: Yes     Partners: Male     Birth control/protection: Pill   Lifestyle     Physical activity     Days per week: Not on file     Minutes per session: Not on file     Stress: Not on file   Relationships     Social connections     Talks on phone: Not on file     Gets together: Not on file     Attends [employer]ism service: Not on file     Active member of club or organization: Not on file     Attends meetings of clubs or organizations: Not on file     Relationship status: Not on file     Intimate partner violence     Fear of current or ex partner: Not on file     Emotionally abused: Not on file     Physically abused: Not on file     Forced sexual activity: Not on file   Other Topics Concern     Parent/sibling w/ CABG, MI or angioplasty before 65F 55M? Not Asked   Social History Narrative    .   A2.  Works part time as  at [employer].       Current Outpatient Medications   Medication     MICROGESTIN 1-20 MG-MCG tablet     cetirizine (ZYRTEC) 10 MG tablet     No current facility-administered medications for this visit.         Allergies   Allergen Reactions     Doxycycline Palpitations     Hives          Reviewed and  updated as needed this visit by Provider       Review of Systems   Constitutional: Negative for fatigue and fever.   Respiratory: Negative for cough, chest tightness and shortness of breath.    Cardiovascular: Negative for chest pain.   Gastrointestinal: Positive for abdominal pain. Negative for abdominal distention, constipation, diarrhea, hematochezia, nausea and vomiting.   Genitourinary: Negative for dysuria, hematuria and pelvic pain.   Musculoskeletal: Positive for back pain.        Objective   Reported vitals:  There were no vitals taken for this visit.     PSYCH: Alert and oriented times 3; coherent speech, normal   rate and volume, able to articulate logical thoughts, able   to abstract reason, no tangential thoughts, no hallucinations   or delusions  Her affect is normal and pleasant  RESP: No cough, no audible wheezing, able to talk in full sentences  Remainder of exam unable to be completed due to telephone visits    Diagnostic Test Results:  Labs reviewed in [facility]    Imaging pending:  CT Chest Abdomen w/o & w contrast: radiology to decide if contrast is necessary        Assessment/Plan:  1. RUQ abdominal pain  Comment: Unclear etiology. Ddx include bilary colic, acute cholangitis (unlikely without fever and leukocytosis), sphincter of oddi dysfunction, portal vein thrombosis (unlikely without history of liver disease), hepatitis (no risk factors for hepatitis identified and no sx of acute hepatitis), pancreatitis (unlikely given normal US), liver abscess (unlikely without fever or underlying disease), peptic ulcer disease, muscle strain, other. It will be best to perform CT scan chest/abdomen to investigate for alternate causes. May also consider HIDA scan. Refer to GI for additional input. Pt has hx of diverticulosis that has been stable for several years.   Plan:   - GASTROENTEROLOGY ADULT REF CONSULT ONLY; Future   - CT Chest Abdomen w/o & w Contrast; Future    2. Encounter for screening mammogram for  breast cancer  Comment: Per routine screening guidelines. Mother recently diagnosed with breast CA.   Plan:   - MA SCREENING DIGITAL BILAT - Future  (s+30); Future    3. Encounter for surveillance of contraceptive pills  Comment: Per patient request.   Plan:   - MICROGESTIN 1-20 MG-MCG tablet; Take 1 tablet by mouth daily  Dispense: 90 tablet; Refill: 3      Follow-up: Imaging results pending, will follow-up as results indicate. Return to clinic this summer/fall for routine preventative exam.     Phone call duration:  25 minutes    ART Wilson CNP  [facility]

## 2020-05-24 ENCOUNTER — MYC MEDICAL ADVICE (OUTPATIENT)
Dept: PEDIATRICS | Facility: CLINIC | Age: 50
End: 2020-05-24

## 2020-05-24 DIAGNOSIS — R10.11 RUQ ABDOMINAL PAIN: Primary | ICD-10-CM

## 2020-05-26 NOTE — TELEPHONE ENCOUNTER
Called patient and LVM to return phone call with any questions or concerns, sent [employer] message providing patient with message from Amy and radiology scheduling number.     Closing encounter at this time.     Meka Hubbard CMA

## 2020-05-26 NOTE — TELEPHONE ENCOUNTER
Recommend moving forward with HIDA scan to further investigate cause of RUQ abdominal pain. Will hold off on CT scan until after reviewing HIDA scan results, will decide if further investigation with CT is necessary at that time.     Amy Cárdenas, DNP, APRN, CNP

## 2020-05-29 ENCOUNTER — HOSPITAL ENCOUNTER (OUTPATIENT)
Dept: NUCLEAR MEDICINE | Facility: CLINIC | Age: 50
Setting detail: NUCLEAR MEDICINE
Discharge: HOME | End: 2020-05-29
Attending: NURSE PRACTITIONER | Admitting: NURSE PRACTITIONER
Payer: COMMERCIAL

## 2020-05-29 DIAGNOSIS — R10.11 RUQ ABDOMINAL PAIN: ICD-10-CM

## 2020-05-29 PROCEDURE — A9537 TC99M MEBROFENIN: HCPCS | Performed by: NURSE PRACTITIONER

## 2020-05-29 PROCEDURE — 25000128 H RX IP 250 OP 636: Performed by: NURSE PRACTITIONER

## 2020-05-29 PROCEDURE — 78227 HEPATOBIL SYST IMAGE W/DRUG: CPT

## 2020-05-29 PROCEDURE — 34300033 ZZH RX 343: Performed by: NURSE PRACTITIONER

## 2020-05-29 RX ORDER — KIT FOR THE PREPARATION OF TECHNETIUM TC 99M MEBROFENIN 45 MG/10ML
6 INJECTION, POWDER, LYOPHILIZED, FOR SOLUTION INTRAVENOUS ONCE
Status: COMPLETED | OUTPATIENT
Start: 2020-05-29 | End: 2020-05-29

## 2020-05-29 RX ADMIN — MEBROFENIN 6.4 MILLICURIE: 45 INJECTION, POWDER, LYOPHILIZED, FOR SOLUTION INTRAVENOUS at 10:00

## 2020-05-29 RX ADMIN — SINCALIDE 1.2 MCG: 5 INJECTION, POWDER, LYOPHILIZED, FOR SOLUTION INTRAVENOUS at 10:53

## 2020-05-31 DIAGNOSIS — R10.11 RUQ ABDOMINAL PAIN: Primary | ICD-10-CM

## 2020-05-31 NOTE — RESULT ENCOUNTER NOTE
Please call the patient and let her know the following:    Your HIDA scan results indicate an abnormally low gallbladder ejection fraction, which means your gallbladder is emptying slowly. This could mean you have cholecystitis (inflammed gallbladder). I recommend that you follow-up with a surgeon to see if you might benefit from having your gallbladder removed. I placed a referral for you at [facility] in [place]. The number for scheduling is [phone].    Please encourage patient to call our clinic with any questions or concerns.     Thanks,   Amy

## 2020-06-01 ENCOUNTER — MYC MEDICAL ADVICE (OUTPATIENT)
Dept: PEDIATRICS | Facility: CLINIC | Age: 50
End: 2020-06-01

## 2020-06-01 ENCOUNTER — TELEPHONE (OUTPATIENT)
Dept: FAMILY MEDICINE | Facility: CLINIC | Age: 50
End: 2020-06-01

## 2020-06-01 NOTE — TELEPHONE ENCOUNTER
Patient Returning Call  Reason for call:  Pt was returning call  Information relayed to patient:  Will get call back  Patient has additional questions:  Yes  What are your questions/concerns:  Pt states they were contacted from someone in the clinic in regards to lab results. No labs have been finalized at this time or released to [facility]. They also mention being contacted by a coordination pool and imaging from a non [facility] to schedule an appointment. No notes in their chart at the time of call. Please contact to discuss and advise further details.  Okay to leave a detailed message?: Yes at Mobile number on file [phone] (M)

## 2020-06-01 NOTE — TELEPHONE ENCOUNTER
Please see [employer] message regarding next steps for gallbladder     No [place]     3:23 PM June 1, 2020

## 2020-06-01 NOTE — TELEPHONE ENCOUNTER
"Called patient back to relay results about imaging per Amy Cárdenas yet no lab results   \" Notes recorded by Amy Cárdenas APRN CNP on 5/31/2020 at 10:13 AM CDT   Please call the patient and let her know the following:     Your HIDA scan results indicate an abnormally low gallbladder ejection fraction, which means your gallbladder is emptying slowly. This could mean you have cholecystitis (inflammed gallbladder). I recommend that you follow-up with a surgeon to see if you might benefit from having your gallbladder removed. I placed a referral for you at [facility] in [place]. The number for scheduling is [phone].     Please encourage patient to call our clinic with any questions or concerns.     Thanks,   Amy  \"    Please relay to patient. Catrina Mata CMA on 6/1/2020 at 1:55 PM   "

## 2020-06-04 ENCOUNTER — VIRTUAL VISIT (OUTPATIENT)
Dept: SURGERY | Facility: CLINIC | Age: 50
End: 2020-06-04
Payer: COMMERCIAL

## 2020-06-04 ENCOUNTER — PREP FOR PROCEDURE (OUTPATIENT)
Dept: SURGERY | Facility: CLINIC | Age: 50
End: 2020-06-04

## 2020-06-04 ENCOUNTER — TELEPHONE (OUTPATIENT)
Dept: SURGERY | Facility: CLINIC | Age: 50
End: 2020-06-04

## 2020-06-04 VITALS — BODY MASS INDEX: 23.92 KG/M2 | WEIGHT: 130 LBS | HEIGHT: 62 IN

## 2020-06-04 DIAGNOSIS — K82.8 BILIARY DYSKINESIA: Primary | ICD-10-CM

## 2020-06-04 DIAGNOSIS — Z11.59 ENCOUNTER FOR SCREENING FOR OTHER VIRAL DISEASES: Primary | ICD-10-CM

## 2020-06-04 PROCEDURE — 99243 OFF/OP CNSLTJ NEW/EST LOW 30: CPT | Mod: 95 | Performed by: SURGERY

## 2020-06-04 ASSESSMENT — MIFFLIN-ST. JEOR: SCORE: 1167.93

## 2020-06-04 NOTE — TELEPHONE ENCOUNTER
Type of surgery: LAPAROSCOPIC CHOLECYSTECTOMY   Location of surgery: [facility]  Date and time of surgery: 6/17/2020 @ 7:30 AM   Surgeon: Shannon Youssef MD   Pre-Op Appt Date: PATIENT TO SCHEDULE    Post-Op Appt Date: PATIENT TO SCHEDULE     Packet sent out: Yes  Pre-cert/Authorization completed:  Not Applicable  Date: 6/4/2020       LAPAROSCOPIC CHOLECYSTECTOMY   GENERAL PT INST TO HAVE H&P WITH DR BERGMAN 60 MIN REQ PA ASSIST [facility] NMS

## 2020-06-04 NOTE — PROGRESS NOTES
"Carrie Germain is a 50 year old female who is being evaluated via a billable telephone visit.      The patient has been notified of following:     \"This telephone visit will be conducted via a call between you and your physician/provider. We have found that certain health care needs can be provided without the need for a physical exam.  This service lets us provide the care you need with a short phone conversation.  If a prescription is necessary we can send it directly to your pharmacy.  If lab work is needed we can place an order for that and you can then stop by our lab to have the test done at a later time.    Telephone visits are billed at different rates depending on your insurance coverage. During this emergency period, for some insurers they may be billed the same as an in-person visit.  Please reach out to your insurance provider with any questions.    If during the course of the call the physician/provider feels a telephone visit is not appropriate, you will not be charged for this service.\"    Patient has given verbal consent for Telephone visit?  Yes    What phone number would you like to be contacted at? [phone]    Phone call duration: 23 minutes    Shannon Youssef MD    Review Of Systems  Skin: negative  Eyes: negative  Ears/Nose/Throat: negative  Respiratory: No shortness of breath, dyspnea on exertion, cough, or hemoptysis  Cardiovascular: negative  Gastrointestinal: heartburn, abdominal pain and gallbladder trouble  Genitourinary: negative  Musculoskeletal: negative  Neurologic: negative  Psychiatric: negative  Hematologic/Lymphatic/Immunologic: negative  Endocrine: negative    [facility]  New Patient Office (Telephone) Visit  Carrie Germain is a 50 year old female seen in consultation for Abdominal pain, epigastric at the request of ART Wilson CNP.     Assessment and Plan:  It is my impression that Carrie has biliary dyskinesia with a markedly reduced ejection fraction " of 1%. We discussed the nature of biliary dyskinesia and how most, but not all patients, will have improvement of symptoms with cholecystectomy     I have offered her a laparoscopic cholecystectomy.      We have discussed the indication, alternatives, risks and expected recovery.  Specifically we have discussed incisions, scarring, postoperative infections, anesthesia, bleeding, blood transfusion, open conversion, common bile duct injury, injury to intra-abdominal organs, adhesions that can lead to bowel obstruction, retained common bile duct stone, bile leak, DVT, PE, hernia, post cholecystectomy diarrhea, postoperative dietary restrictions and physical limitations.  We have discussed the recommended interventions and treatments for these complications.  All questions have been answered to the best of my ability.           We will schedule surgery at the patient's convenience.  Needs a preop H&P to be performed by PCP.    Chief complaint:  Abdominal pain, epigastric    HPI:  Carrie Germain is a 50 year old female who presents with right upper quadrant, epigastric, and infrascapular back pain for the past 6 months. The pain is intermittent, usually lasts for 2-3 hours, and tends to worsen at night.  She mostly feels the pain under her right ribs, but it does radiate up into her chest, between her shoulder blades, and occasionally into her right hip. Her sister had similar symptoms and was diagnosed with biliary dyskinesia via a HIDA scan. As such the patient requested a HIDA scan, which indeed showed markedly reduced gallbladder ejection fraction at 1%.  She has not undergone an ultrasound, upper endoscopy, or any other workup for upper abdominal pain.  Negative for associated symptoms of anorexia, nausea, vomiting, diarrhea, constipation, belching, fever, chills and weight change.  She does not have a history of jaundice or dark urine.  She  has not had pancreatitis in the past.        Past Medical  "History:  Colitis (2005) and Diverticulosis (2005).    Past Surgical History:  Past Surgical History:   Procedure Laterality Date     LEEP TX, CERVICAL      1987       Social History:  Social History     Tobacco Use     Smoking status: Never Smoker     Smokeless tobacco: Never Used   Substance Use Topics     Alcohol use: Yes     Drug use: No        Family History:  Family History   Problem Relation Age of Onset     Hypertension Mother      Hyperlipidemia Mother      Prostate Cancer Father      Breast Cancer Maternal Grandmother         50's     Coronary Artery Disease Paternal Grandmother         50's     Other Cancer Other         bone cancer     Hyperlipidemia Sister      Hypertension Sister      No FH bleeding or clotting problems or reactions to anesthesia. Her mother had a cholecystectomy for gallstones in her 20s, and her sister had a cholecystectomy for biliary dyskinesia in her 40s.     Review of Systems:  The 10 point review of systems is negative other than noted in the HPI and above.    Physical Exam:  Vitals: Ht 1.575 m (5' 2\")   Wt 59 kg (130 lb)   LMP 05/17/2020   BMI 23.78 kg/m    BMI= Body mass index is 23.78 kg/m .   Exam deferred (phone call)    Relevant labs:  Liver Function Studies -   Recent Labs   Lab Test 08/18/15  0735   PROTTOTAL 7.2   ALBUMIN 3.4   BILITOTAL 0.4   ALKPHOS 51   AST 13   ALT 13       Imaging:    Recent Results (from the past 744 hour(s))   NM Hepatobiliary Scan w GB EF    Narrative    NUCLEAR MEDICINE HEPATOBILIARY SCAN WITH GALLBLADDER EJECTION FRACTION  5/29/2020 1:00 PM     HISTORY: Right upper quadrant pain, no fever, no elevated white blood  count. Right upper quadrant pain since 12/2019, ultrasound negative.  ED recommended HIDA versus CT as outpatient for further investigation  of cause. Right upper quadrant abdominal pain.    COMPARISON: None.    TECHNIQUE: The patient received Tc-99m mebrofenin intravenously.  Cholecystokinin injected to evaluate the gallbladder " ejection  fraction.    MEDICATIONS AND/OR DOSE: 6.4mCi Tc99m Mebrofenin 1.2mcg IV CCK started  @55min    FINDINGS: There is prompt clearance of the radionuclide from the blood  pool into the liver. Promptly there is clear visualization of the  intrahepatic ducts as well as the upper common bile duct. Within the  normal time frame there is visualization of the gallbladder, distal  common bile duct, and the small bowel.     The gallbladder ejection fraction is  1%, abnormally low.      Impression    IMPRESSION: Abnormally low gallbladder ejection fraction, differential  includes cholecystitis and dyskinesia.    KY BRANDT MD       This note was created using voice recognition software. Undetected word substitutions or other errors may have occurred.     Time spent with the patient with greater that 50% of the time in discussion was 25 minutes.     Shannon Youssef MD  [facility] [place]    Please route or send letter to:  Referring Provider

## 2020-06-08 ENCOUNTER — OFFICE VISIT (OUTPATIENT)
Dept: PEDIATRICS | Facility: CLINIC | Age: 50
End: 2020-06-08
Payer: COMMERCIAL

## 2020-06-08 VITALS
BODY MASS INDEX: 24.13 KG/M2 | WEIGHT: 131.1 LBS | HEART RATE: 80 BPM | OXYGEN SATURATION: 95 % | SYSTOLIC BLOOD PRESSURE: 122 MMHG | DIASTOLIC BLOOD PRESSURE: 64 MMHG | RESPIRATION RATE: 18 BRPM | HEIGHT: 62 IN | TEMPERATURE: 97.2 F

## 2020-06-08 DIAGNOSIS — Z01.818 PREOP GENERAL PHYSICAL EXAM: Primary | ICD-10-CM

## 2020-06-08 DIAGNOSIS — K82.8 BILIARY DYSKINESIA: ICD-10-CM

## 2020-06-08 DIAGNOSIS — Z00.00 HEALTHCARE MAINTENANCE: ICD-10-CM

## 2020-06-08 LAB
ERYTHROCYTE [DISTWIDTH] IN BLOOD BY AUTOMATED COUNT: 12.9 % (ref 10–15)
HCT VFR BLD AUTO: 37.6 % (ref 35–47)
HGB BLD-MCNC: 12.2 G/DL (ref 11.7–15.7)
MCH RBC QN AUTO: 31.1 PG (ref 26.5–33)
MCHC RBC AUTO-ENTMCNC: 32.4 G/DL (ref 31.5–36.5)
MCV RBC AUTO: 96 FL (ref 78–100)
PLATELET # BLD AUTO: 255 10E9/L (ref 150–450)
RBC # BLD AUTO: 3.92 10E12/L (ref 3.8–5.2)
WBC # BLD AUTO: 7.2 10E9/L (ref 4–11)

## 2020-06-08 PROCEDURE — 85027 COMPLETE CBC AUTOMATED: CPT | Performed by: NURSE PRACTITIONER

## 2020-06-08 PROCEDURE — 99214 OFFICE O/P EST MOD 30 MIN: CPT | Performed by: NURSE PRACTITIONER

## 2020-06-08 PROCEDURE — 36415 COLL VENOUS BLD VENIPUNCTURE: CPT | Performed by: NURSE PRACTITIONER

## 2020-06-08 PROCEDURE — 80053 COMPREHEN METABOLIC PANEL: CPT | Performed by: NURSE PRACTITIONER

## 2020-06-08 PROCEDURE — 87389 HIV-1 AG W/HIV-1&-2 AB AG IA: CPT | Performed by: NURSE PRACTITIONER

## 2020-06-08 ASSESSMENT — MIFFLIN-ST. JEOR: SCORE: 1172.92

## 2020-06-08 NOTE — PROGRESS NOTES
"  [facility]  [address]  [address]  [place] [address]  [phone]  Dept: [phone]    PRE-OP EVALUATION:  Today's date: 2020    Carrie Germain (: 1970) presents for pre-operative evaluation assessment as requested by Dr. Shannon Youssef.  She requires evaluation and anesthesia risk assessment prior to undergoing surgery/procedure for treatment of Biliary Dyskinesia- Cholecystectomy    Proposed Surgery/ Procedure: Biliary Dyskinesia- Cholecystectomy  Date of Surgery/ Procedure: 2020  Time of Surgery/ Procedure: 7:30 am  Hospital/Surgical Facility: [facility]   Fax number for surgical facility: [phone]   Primary Physician: Amy Cárdenas  Type of Anesthesia Anticipated: General    Patient has a Health Care Directive or Living Will:  NO    1. NO - Do you have a history of heart attack, stroke, stent, bypass or surgery on an artery in the head, neck, heart or legs?\",  2. NO - Do you ever have any pain or discomfort in your chest?\",  3. NO - Do you have a history of  Heart Failure?\",  4. NO - Are you troubled by shortness of breath when: walking on the level, up a slight hill or at night?\",  5. NO - Do you currently have a cold, bronchitis or other respiratory infection?\",  6. NO - Do you have a cough, shortness of breath or wheezing?\",  7. NO - Do you sometimes get pains in the calves of your legs when you walk?\",  8. NO - Do you or anyone in your family have previous history of blood clots?\",  9. NO - Do you or does anyone in your family have a serious bleeding problem such as prolonged bleeding following surgeries or cuts?\",  10. NO - Have you ever had problems with anemia or been told to take iron pills?\"  11. NO - Have you had any abnormal blood loss such as black, tarry or bloody stools, or abnormal vaginal bleeding?\",  12. NO - Have you ever had a blood transfusion?\",  13. NO - Have you or any of your relatives ever had problems with " "anesthesia?\",  14. NO - Do you have sleep apnea, excessive snoring or daytime drowsiness?\",  15. NO - Do you have any prosthetic heart valves?\",  16. NO - Do you have prosthetic joints?\",  17. NO - Is there any chance that you may be pregnant?\"}    HPI:     HPI related to upcoming procedure:     Carrie has a 6 month history of right upper quadrant, epigastric, and infrascapular back pain. Patient had a HIDA scan completed 5/29 which showed markedly reduced gallbladder ejection fraction at 1%. She was seen by surgery for consult on 6/4 at which time it was recommended she undergo laparoscopic cholecystectomy.     MEDICAL HISTORY:     Patient Active Problem List    Diagnosis Date Noted     Biliary dyskinesia 06/04/2020     Priority: Medium     Added automatically from request for surgery [id number]        Past Medical History:   Diagnosis Date     Colitis 2005     Diverticulosis 2005     Past Surgical History:   Procedure Laterality Date     LEEP TX, CERVICAL      1987     Family History   Problem Relation Age of Onset     Hypertension Mother      Hyperlipidemia Mother      Prostate Cancer Father      Breast Cancer Maternal Grandmother         50's     Coronary Artery Disease Paternal Grandmother         50's     Other Cancer Other         bone cancer     Hyperlipidemia Sister      Hypertension Sister      Social History     Socioeconomic History     Marital status:      Spouse name: Not on file     Number of children: Not on file     Years of education: Not on file     Highest education level: Not on file   Occupational History     Not on file   Social Needs     Financial resource strain: Not on file     Food insecurity     Worry: Not on file     Inability: Not on file     Transportation needs     Medical: Not on file     Non-medical: Not on file   Tobacco Use     Smoking status: Never Smoker     Smokeless tobacco: Never Used   Substance and Sexual Activity     Alcohol use: Yes     Drug use: No     Sexual " activity: Yes     Partners: Male     Birth control/protection: Pill   Lifestyle     Physical activity     Days per week: Not on file     Minutes per session: Not on file     Stress: Not on file   Relationships     Social connections     Talks on phone: Not on file     Gets together: Not on file     Attends Cheondoism service: Not on file     Active member of club or organization: Not on file     Attends meetings of clubs or organizations: Not on file     Relationship status: Not on file     Intimate partner violence     Fear of current or ex partner: Not on file     Emotionally abused: Not on file     Physically abused: Not on file     Forced sexual activity: Not on file   Other Topics Concern     Parent/sibling w/ CABG, MI or angioplasty before 65F 55M? Not Asked   Social History Narrative    .   A2.  Works part time as  at [employer].         Current Outpatient Medications   Medication Sig Dispense Refill     cetirizine (ZYRTEC) 10 MG tablet Take 1 tablet (10 mg) by mouth every evening 30 tablet 1     MICROGESTIN 1-20 MG-MCG tablet Take 1 tablet by mouth daily 90 tablet 3     OTC products: Multivitamin    Allergies   Allergen Reactions     Doxycycline Palpitations     Hives         Latex Allergy: NO      REVIEW OF SYSTEMS:   CONSTITUTIONAL: NEGATIVE for fever, chills, change in weight  INTEGUMENTARY/SKIN: NEGATIVE for worrisome rashes, moles or lesions  EYES: NEGATIVE for vision changes or irritation  ENT/MOUTH: NEGATIVE for ear, mouth and throat problems  RESP: NEGATIVE for significant cough or SOB  BREAST: NEGATIVE for masses, tenderness or discharge  CV: NEGATIVE for chest pain, palpitations or peripheral edema  GI: NEGATIVE for nausea, heartburn, or change in bowel habits. Ongoing right upper quadrant abdominal pain, see HPI.   : NEGATIVE for frequency, dysuria, or hematuria  MUSCULOSKELETAL: NEGATIVE for significant arthralgias or myalgia  NEURO: NEGATIVE for weakness,  "dizziness or paresthesias  ENDOCRINE: NEGATIVE for temperature intolerance, skin/hair changes  HEME: NEGATIVE for bleeding problems  PSYCHIATRIC: NEGATIVE for changes in mood or affect    EXAM:   /64 (BP Location: Right arm, Patient Position: Chair, Cuff Size: Adult Regular)   Pulse 80   Temp 97.2  F (36.2  C) (Tympanic)   Resp 18   Ht 1.575 m (5' 2\")   Wt 59.5 kg (131 lb 1.6 oz)   LMP 05/17/2020   SpO2 95%   BMI 23.98 kg/m      Constitutional: appears to be in no acute distress, comfortable, pleasant.   Eyes: anicteric, conjunctiva clear without drainage or erythema. KAREL.   Ears, Nose and Throat: tympanic membranes gray with LR,  nose without nasal discharge. OP: no erythema to posterior pharynx, negative post nasal drainage, tonsils +1 no erythema or exudate.  Neck: supple, thyroid palpable,not enlarged, no nodules   Cardiovascular: regular rate and rhythm, normal S1 and S2, no murmurs, rubs or gallops, peripheral pulses full and symmetric; negative peripheral edema   Respiratory: Air entry throughout. Breathing pattern unlabored without the use of accessory muscles. Clear to auscultation A and P, no wheezes or crackles, normal breath sounds.    Gastrointestinal: rounded, soft. Positive bowel sounds x4, no masses. RUQ tenderness upon laying flat.   Musculoskeletal: full range of motion, no edema.   Skin: pink, turgor smooth and elastic. Negative for lesions or dryness.  Neurological: normal gait, no tremor.   Psychological: appropriate mood and affect.   Lymphatic: no cervical, axillary, supraclavicular, or infraclavicular lymphadenopathy.      DIAGNOSTICS:     Results for orders placed or performed in visit on 06/08/20   Comprehensive metabolic panel (BMP + Alb, Alk Phos, ALT, AST, Total. Bili, TP)     Status: None   Result Value Ref Range    Sodium 138 133 - 144 mmol/L    Potassium 4.1 3.4 - 5.3 mmol/L    Chloride 108 94 - 109 mmol/L    Carbon Dioxide 24 20 - 32 mmol/L    Anion Gap 6 3 - 14 mmol/L "    Glucose 92 70 - 99 mg/dL    Urea Nitrogen 10 7 - 30 mg/dL    Creatinine 0.72 0.52 - 1.04 mg/dL    GFR Estimate >90 >60 mL/min/[1.73_m2]    GFR Estimate If Black >90 >60 mL/min/[1.73_m2]    Calcium 8.8 8.5 - 10.1 mg/dL    Bilirubin Total 0.4 0.2 - 1.3 mg/dL    Albumin 3.4 3.4 - 5.0 g/dL    Protein Total 7.6 6.8 - 8.8 g/dL    Alkaline Phosphatase 74 40 - 150 U/L    ALT 14 0 - 50 U/L    AST 19 0 - 45 U/L   CBC with platelets     Status: None   Result Value Ref Range    WBC 7.2 4.0 - 11.0 10e9/L    RBC Count 3.92 3.8 - 5.2 10e12/L    Hemoglobin 12.2 11.7 - 15.7 g/dL    Hematocrit 37.6 35.0 - 47.0 %    MCV 96 78 - 100 fl    MCH 31.1 26.5 - 33.0 pg    MCHC 32.4 31.5 - 36.5 g/dL    RDW 12.9 10.0 - 15.0 %    Platelet Count 255 150 - 450 10e9/L     Laboratory testing pending:  HIV Antigen Antibody Combo (as part of healthcare maintenance)    IMPRESSION:   Reason for surgery/procedure: Biliary Dyskinesia  Diagnosis/reason for consult: Preoperative exam - Cholecystectomy    Surgical Risk:  The proposed surgical procedure is considered INTERMEDIATE risk.    Revised Cardiac Risk Index  The patient has the following serious cardiovascular risks for perioperative complications such as (MI, PE, VFib and 3  AV Block): No serious cardiac risks    INTERPRETATION: 0 risks: Class I (very low risk - 0.4% complication rate)    [facility] Activity Status Index  Total Points: 58.2  Total METs: 9.89    Functional capacity is classified as:  Excellent: >10 METs  Good: 7-10 METs   Moderate: 4 - 6 METs  Poor: <4 METs    The patient has the following additional risks for perioperative complications:  No identified additional risks        ICD-10-CM    1. Preop general physical exam  Z01.818 Comprehensive metabolic panel (BMP + Alb, Alk Phos, ALT, AST, Total. Bili, TP)     CBC with platelets   2. Biliary dyskinesia  K82.8 Comprehensive metabolic panel (BMP + Alb, Alk Phos, ALT, AST, Total. Bili, TP)     CBC with platelets   3. Healthcare maintenance   Z00.00 HIV Antigen Antibody Combo     RECOMMENDATIONS:     --Patient is to take all scheduled medications on the day of surgery.    APPROVAL GIVEN to proceed with proposed procedure, without further diagnostic evaluation       Signed Electronically by: ART Wilson CNP    Copy of this evaluation report is provided to requesting physician.    [facility] Preop Guidelines    Revised Cardiac Risk Index

## 2020-06-09 LAB
ALBUMIN SERPL-MCNC: 3.4 G/DL (ref 3.4–5)
ALP SERPL-CCNC: 74 U/L (ref 40–150)
ALT SERPL W P-5'-P-CCNC: 14 U/L (ref 0–50)
ANION GAP SERPL CALCULATED.3IONS-SCNC: 6 MMOL/L (ref 3–14)
AST SERPL W P-5'-P-CCNC: 19 U/L (ref 0–45)
BILIRUB SERPL-MCNC: 0.4 MG/DL (ref 0.2–1.3)
BUN SERPL-MCNC: 10 MG/DL (ref 7–30)
CALCIUM SERPL-MCNC: 8.8 MG/DL (ref 8.5–10.1)
CHLORIDE SERPL-SCNC: 108 MMOL/L (ref 94–109)
CO2 SERPL-SCNC: 24 MMOL/L (ref 20–32)
CREAT SERPL-MCNC: 0.72 MG/DL (ref 0.52–1.04)
GFR SERPL CREATININE-BSD FRML MDRD: >90 ML/MIN/{1.73_M2}
GLUCOSE SERPL-MCNC: 92 MG/DL (ref 70–99)
HIV 1+2 AB+HIV1 P24 AG SERPL QL IA: NONREACTIVE
POTASSIUM SERPL-SCNC: 4.1 MMOL/L (ref 3.4–5.3)
PROT SERPL-MCNC: 7.6 G/DL (ref 6.8–8.8)
SODIUM SERPL-SCNC: 138 MMOL/L (ref 133–144)

## 2020-06-14 DIAGNOSIS — Z11.59 ENCOUNTER FOR SCREENING FOR OTHER VIRAL DISEASES: ICD-10-CM

## 2020-06-14 PROCEDURE — U0003 INFECTIOUS AGENT DETECTION BY NUCLEIC ACID (DNA OR RNA); SEVERE ACUTE RESPIRATORY SYNDROME CORONAVIRUS 2 (SARS-COV-2) (CORONAVIRUS DISEASE [COVID-19]), AMPLIFIED PROBE TECHNIQUE, MAKING USE OF HIGH THROUGHPUT TECHNOLOGIES AS DESCRIBED BY CMS-2020-01-R: HCPCS | Mod: 90 | Performed by: SURGERY

## 2020-06-14 PROCEDURE — 99000 SPECIMEN HANDLING OFFICE-LAB: CPT | Performed by: SURGERY

## 2020-06-15 LAB
SARS-COV-2 RNA SPEC QL NAA+PROBE: NOT DETECTED
SPECIMEN SOURCE: NORMAL

## 2020-06-15 RX ORDER — IBUPROFEN 600 MG/1
600 TABLET, FILM COATED ORAL EVERY 6 HOURS PRN
COMMUNITY

## 2020-06-17 ENCOUNTER — HOSPITAL ENCOUNTER (OUTPATIENT)
Facility: CLINIC | Age: 50
Discharge: HOME | End: 2020-06-17
Attending: SURGERY | Admitting: SURGERY
Payer: COMMERCIAL

## 2020-06-17 ENCOUNTER — SURGERY (OUTPATIENT)
Age: 50
End: 2020-06-17
Payer: COMMERCIAL

## 2020-06-17 ENCOUNTER — ANESTHESIA (OUTPATIENT)
Dept: SURGERY | Facility: CLINIC | Age: 50
End: 2020-06-17
Payer: COMMERCIAL

## 2020-06-17 ENCOUNTER — APPOINTMENT (OUTPATIENT)
Dept: SURGERY | Facility: PHYSICIAN GROUP | Age: 50
End: 2020-06-17
Payer: COMMERCIAL

## 2020-06-17 ENCOUNTER — ANESTHESIA EVENT (OUTPATIENT)
Dept: SURGERY | Facility: CLINIC | Age: 50
End: 2020-06-17
Payer: COMMERCIAL

## 2020-06-17 VITALS
RESPIRATION RATE: 18 BRPM | OXYGEN SATURATION: 100 % | BODY MASS INDEX: 24.11 KG/M2 | SYSTOLIC BLOOD PRESSURE: 107 MMHG | DIASTOLIC BLOOD PRESSURE: 72 MMHG | HEIGHT: 62 IN | WEIGHT: 131 LBS | TEMPERATURE: 97.5 F | HEART RATE: 58 BPM

## 2020-06-17 DIAGNOSIS — K82.8 BILIARY DYSKINESIA: ICD-10-CM

## 2020-06-17 LAB — HCG UR QL: NEGATIVE

## 2020-06-17 PROCEDURE — 47562 LAPAROSCOPIC CHOLECYSTECTOMY: CPT | Performed by: SURGERY

## 2020-06-17 PROCEDURE — 47562 LAPAROSCOPIC CHOLECYSTECTOMY: CPT | Mod: AS | Performed by: PHYSICIAN ASSISTANT

## 2020-06-17 PROCEDURE — 27210794 ZZH OR GENERAL SUPPLY STERILE: Performed by: SURGERY

## 2020-06-17 PROCEDURE — 25000132 ZZH RX MED GY IP 250 OP 250 PS 637: Performed by: ANESTHESIOLOGY

## 2020-06-17 PROCEDURE — 88304 TISSUE EXAM BY PATHOLOGIST: CPT | Mod: 26 | Performed by: SURGERY

## 2020-06-17 PROCEDURE — 25000128 H RX IP 250 OP 636: Performed by: PHYSICIAN ASSISTANT

## 2020-06-17 PROCEDURE — 36000056 ZZH SURGERY LEVEL 3 1ST 30 MIN: Performed by: SURGERY

## 2020-06-17 PROCEDURE — 71000015 ZZH RECOVERY PHASE 1 LEVEL 2 EA ADDTL HR: Performed by: SURGERY

## 2020-06-17 PROCEDURE — 25800030 ZZH RX IP 258 OP 636: Performed by: ANESTHESIOLOGY

## 2020-06-17 PROCEDURE — 40000306 ZZH STATISTIC PRE PROC ASSESS II: Performed by: SURGERY

## 2020-06-17 PROCEDURE — 71000027 ZZH RECOVERY PHASE 2 EACH 15 MINS: Performed by: SURGERY

## 2020-06-17 PROCEDURE — 36000058 ZZH SURGERY LEVEL 3 EA 15 ADDTL MIN: Performed by: SURGERY

## 2020-06-17 PROCEDURE — 25800030 ZZH RX IP 258 OP 636: Performed by: NURSE ANESTHETIST, CERTIFIED REGISTERED

## 2020-06-17 PROCEDURE — 71000014 ZZH RECOVERY PHASE 1 LEVEL 2 FIRST HR: Performed by: SURGERY

## 2020-06-17 PROCEDURE — 37000008 ZZH ANESTHESIA TECHNICAL FEE, 1ST 30 MIN: Performed by: SURGERY

## 2020-06-17 PROCEDURE — 81025 URINE PREGNANCY TEST: CPT | Performed by: ANESTHESIOLOGY

## 2020-06-17 PROCEDURE — 25000125 ZZHC RX 250: Performed by: SURGERY

## 2020-06-17 PROCEDURE — 25000125 ZZHC RX 250: Performed by: ANESTHESIOLOGY

## 2020-06-17 PROCEDURE — 88304 TISSUE EXAM BY PATHOLOGIST: CPT | Performed by: SURGERY

## 2020-06-17 PROCEDURE — 25000128 H RX IP 250 OP 636: Performed by: NURSE ANESTHETIST, CERTIFIED REGISTERED

## 2020-06-17 PROCEDURE — 37000009 ZZH ANESTHESIA TECHNICAL FEE, EACH ADDTL 15 MIN: Performed by: SURGERY

## 2020-06-17 PROCEDURE — 25000125 ZZHC RX 250: Performed by: NURSE ANESTHETIST, CERTIFIED REGISTERED

## 2020-06-17 PROCEDURE — 25000132 ZZH RX MED GY IP 250 OP 250 PS 637: Performed by: SURGERY

## 2020-06-17 RX ORDER — CELECOXIB 200 MG/1
200 CAPSULE ORAL ONCE
Status: COMPLETED | OUTPATIENT
Start: 2020-06-17 | End: 2020-06-17

## 2020-06-17 RX ORDER — DEXAMETHASONE SODIUM PHOSPHATE 4 MG/ML
INJECTION, SOLUTION INTRA-ARTICULAR; INTRALESIONAL; INTRAMUSCULAR; INTRAVENOUS; SOFT TISSUE PRN
Status: DISCONTINUED | OUTPATIENT
Start: 2020-06-17 | End: 2020-06-17

## 2020-06-17 RX ORDER — ONDANSETRON 2 MG/ML
INJECTION INTRAMUSCULAR; INTRAVENOUS PRN
Status: DISCONTINUED | OUTPATIENT
Start: 2020-06-17 | End: 2020-06-17

## 2020-06-17 RX ORDER — AMOXICILLIN 250 MG
1-2 CAPSULE ORAL 2 TIMES DAILY
Qty: 30 TABLET | Refills: 0 | Status: SHIPPED | OUTPATIENT
Start: 2020-06-17 | End: 2020-09-30

## 2020-06-17 RX ORDER — MEPERIDINE HYDROCHLORIDE 50 MG/ML
12.5 INJECTION INTRAMUSCULAR; INTRAVENOUS; SUBCUTANEOUS
Status: DISCONTINUED | OUTPATIENT
Start: 2020-06-17 | End: 2020-06-17 | Stop reason: HOSPADM

## 2020-06-17 RX ORDER — HYDROMORPHONE HYDROCHLORIDE 1 MG/ML
.3-.5 INJECTION, SOLUTION INTRAMUSCULAR; INTRAVENOUS; SUBCUTANEOUS EVERY 10 MIN PRN
Status: DISCONTINUED | OUTPATIENT
Start: 2020-06-17 | End: 2020-06-17 | Stop reason: HOSPADM

## 2020-06-17 RX ORDER — LIDOCAINE HYDROCHLORIDE 20 MG/ML
INJECTION, SOLUTION INFILTRATION; PERINEURAL PRN
Status: DISCONTINUED | OUTPATIENT
Start: 2020-06-17 | End: 2020-06-17

## 2020-06-17 RX ORDER — FENTANYL CITRATE 50 UG/ML
INJECTION, SOLUTION INTRAMUSCULAR; INTRAVENOUS PRN
Status: DISCONTINUED | OUTPATIENT
Start: 2020-06-17 | End: 2020-06-17

## 2020-06-17 RX ORDER — NALOXONE HYDROCHLORIDE 0.4 MG/ML
.1-.4 INJECTION, SOLUTION INTRAMUSCULAR; INTRAVENOUS; SUBCUTANEOUS
Status: DISCONTINUED | OUTPATIENT
Start: 2020-06-17 | End: 2020-06-17 | Stop reason: HOSPADM

## 2020-06-17 RX ORDER — PROPOFOL 10 MG/ML
INJECTION, EMULSION INTRAVENOUS CONTINUOUS PRN
Status: DISCONTINUED | OUTPATIENT
Start: 2020-06-17 | End: 2020-06-17

## 2020-06-17 RX ORDER — ACETAMINOPHEN 325 MG/1
1000 TABLET ORAL EVERY 6 HOURS PRN
Qty: 100 TABLET | Refills: 0 | Status: SHIPPED | OUTPATIENT
Start: 2020-06-17

## 2020-06-17 RX ORDER — OXYCODONE HYDROCHLORIDE 5 MG/1
5-10 TABLET ORAL EVERY 4 HOURS PRN
Qty: 12 TABLET | Refills: 0 | Status: SHIPPED | OUTPATIENT
Start: 2020-06-17 | End: 2020-09-24

## 2020-06-17 RX ORDER — OXYCODONE HYDROCHLORIDE 5 MG/1
10 TABLET ORAL
Status: COMPLETED | OUTPATIENT
Start: 2020-06-17 | End: 2020-06-17

## 2020-06-17 RX ORDER — BUPIVACAINE HYDROCHLORIDE 5 MG/ML
INJECTION, SOLUTION EPIDURAL; INTRACAUDAL PRN
Status: DISCONTINUED | OUTPATIENT
Start: 2020-06-17 | End: 2020-06-17 | Stop reason: HOSPADM

## 2020-06-17 RX ORDER — ONDANSETRON 4 MG/1
4 TABLET, ORALLY DISINTEGRATING ORAL EVERY 30 MIN PRN
Status: DISCONTINUED | OUTPATIENT
Start: 2020-06-17 | End: 2020-06-17 | Stop reason: HOSPADM

## 2020-06-17 RX ORDER — SCOLOPAMINE TRANSDERMAL SYSTEM 1 MG/1
1 PATCH, EXTENDED RELEASE TRANSDERMAL
Status: DISCONTINUED | OUTPATIENT
Start: 2020-06-17 | End: 2020-06-17 | Stop reason: HOSPADM

## 2020-06-17 RX ORDER — ACETAMINOPHEN 325 MG/1
650 TABLET ORAL
Status: DISCONTINUED | OUTPATIENT
Start: 2020-06-17 | End: 2020-06-17 | Stop reason: HOSPADM

## 2020-06-17 RX ORDER — ACETAMINOPHEN 325 MG/1
975 TABLET ORAL ONCE
Status: COMPLETED | OUTPATIENT
Start: 2020-06-17 | End: 2020-06-17

## 2020-06-17 RX ORDER — CEFAZOLIN SODIUM 2 G/100ML
2 INJECTION, SOLUTION INTRAVENOUS
Status: COMPLETED | OUTPATIENT
Start: 2020-06-17 | End: 2020-06-17

## 2020-06-17 RX ORDER — SODIUM CHLORIDE, SODIUM LACTATE, POTASSIUM CHLORIDE, CALCIUM CHLORIDE 600; 310; 30; 20 MG/100ML; MG/100ML; MG/100ML; MG/100ML
INJECTION, SOLUTION INTRAVENOUS CONTINUOUS
Status: DISCONTINUED | OUTPATIENT
Start: 2020-06-17 | End: 2020-06-17 | Stop reason: HOSPADM

## 2020-06-17 RX ORDER — ONDANSETRON 2 MG/ML
4 INJECTION INTRAMUSCULAR; INTRAVENOUS EVERY 30 MIN PRN
Status: DISCONTINUED | OUTPATIENT
Start: 2020-06-17 | End: 2020-06-17 | Stop reason: HOSPADM

## 2020-06-17 RX ORDER — FENTANYL CITRATE 50 UG/ML
25-50 INJECTION, SOLUTION INTRAMUSCULAR; INTRAVENOUS
Status: DISCONTINUED | OUTPATIENT
Start: 2020-06-17 | End: 2020-06-17 | Stop reason: HOSPADM

## 2020-06-17 RX ORDER — CEFAZOLIN SODIUM 1 G/3ML
1 INJECTION, POWDER, FOR SOLUTION INTRAMUSCULAR; INTRAVENOUS SEE ADMIN INSTRUCTIONS
Status: DISCONTINUED | OUTPATIENT
Start: 2020-06-17 | End: 2020-06-17 | Stop reason: HOSPADM

## 2020-06-17 RX ORDER — FENTANYL CITRATE 50 UG/ML
25-50 INJECTION, SOLUTION INTRAMUSCULAR; INTRAVENOUS EVERY 5 MIN PRN
Status: DISCONTINUED | OUTPATIENT
Start: 2020-06-17 | End: 2020-06-17 | Stop reason: HOSPADM

## 2020-06-17 RX ORDER — ALBUTEROL SULFATE 0.83 MG/ML
2.5 SOLUTION RESPIRATORY (INHALATION) EVERY 4 HOURS PRN
Status: DISCONTINUED | OUTPATIENT
Start: 2020-06-17 | End: 2020-06-17 | Stop reason: HOSPADM

## 2020-06-17 RX ORDER — PROPOFOL 10 MG/ML
INJECTION, EMULSION INTRAVENOUS PRN
Status: DISCONTINUED | OUTPATIENT
Start: 2020-06-17 | End: 2020-06-17

## 2020-06-17 RX ORDER — DIPHENHYDRAMINE HYDROCHLORIDE 50 MG/ML
INJECTION INTRAMUSCULAR; INTRAVENOUS PRN
Status: DISCONTINUED | OUTPATIENT
Start: 2020-06-17 | End: 2020-06-17

## 2020-06-17 RX ADMIN — FENTANYL CITRATE 150 MCG: 50 INJECTION, SOLUTION INTRAMUSCULAR; INTRAVENOUS at 09:58

## 2020-06-17 RX ADMIN — SODIUM CHLORIDE, POTASSIUM CHLORIDE, SODIUM LACTATE AND CALCIUM CHLORIDE: 600; 310; 30; 20 INJECTION, SOLUTION INTRAVENOUS at 09:50

## 2020-06-17 RX ADMIN — PROPOFOL 200 MCG/KG/MIN: 10 INJECTION, EMULSION INTRAVENOUS at 10:00

## 2020-06-17 RX ADMIN — BUPIVACAINE HYDROCHLORIDE 30 ML: 5 INJECTION, SOLUTION EPIDURAL; INTRACAUDAL at 10:37

## 2020-06-17 RX ADMIN — SODIUM CHLORIDE, POTASSIUM CHLORIDE, SODIUM LACTATE AND CALCIUM CHLORIDE: 600; 310; 30; 20 INJECTION, SOLUTION INTRAVENOUS at 11:03

## 2020-06-17 RX ADMIN — PROPOFOL 140 MG: 10 INJECTION, EMULSION INTRAVENOUS at 09:58

## 2020-06-17 RX ADMIN — ROCURONIUM BROMIDE 50 MG: 10 INJECTION INTRAVENOUS at 09:58

## 2020-06-17 RX ADMIN — DEXAMETHASONE SODIUM PHOSPHATE 1 ML: 4 INJECTION, SOLUTION INTRA-ARTICULAR; INTRALESIONAL; INTRAMUSCULAR; INTRAVENOUS; SOFT TISSUE at 09:58

## 2020-06-17 RX ADMIN — SUGAMMADEX 200 MG: 100 INJECTION, SOLUTION INTRAVENOUS at 10:37

## 2020-06-17 RX ADMIN — DIPHENHYDRAMINE HYDROCHLORIDE 12.5 MG: 50 INJECTION, SOLUTION INTRAMUSCULAR; INTRAVENOUS at 10:01

## 2020-06-17 RX ADMIN — OXYCODONE HYDROCHLORIDE 5 MG: 5 TABLET ORAL at 12:12

## 2020-06-17 RX ADMIN — ONDANSETRON HYDROCHLORIDE 4 MG: 2 INJECTION, SOLUTION INTRAVENOUS at 10:31

## 2020-06-17 RX ADMIN — CEFAZOLIN SODIUM 2 G: 2 INJECTION, SOLUTION INTRAVENOUS at 10:05

## 2020-06-17 RX ADMIN — LIDOCAINE HYDROCHLORIDE 50 MG: 20 INJECTION, SOLUTION INFILTRATION; PERINEURAL at 09:58

## 2020-06-17 RX ADMIN — DEXMEDETOMIDINE HYDROCHLORIDE 0.7 MCG/KG/HR: 100 INJECTION, SOLUTION INTRAVENOUS at 10:00

## 2020-06-17 RX ADMIN — SCOPALAMINE 1 PATCH: 1 PATCH, EXTENDED RELEASE TRANSDERMAL at 09:18

## 2020-06-17 RX ADMIN — CELECOXIB 200 MG: 200 CAPSULE ORAL at 09:18

## 2020-06-17 RX ADMIN — ACETAMINOPHEN 975 MG: 325 TABLET, FILM COATED ORAL at 09:18

## 2020-06-17 ASSESSMENT — ENCOUNTER SYMPTOMS: DYSRHYTHMIAS: 0

## 2020-06-17 ASSESSMENT — MIFFLIN-ST. JEOR: SCORE: 1172.46

## 2020-06-17 NOTE — DISCHARGE INSTRUCTIONS
GENERAL ANESTHESIA OR SEDATION ADULT DISCHARGE INSTRUCTIONS   SPECIAL PRECAUTIONS FOR 24 HOURS AFTER SURGERY    IT IS NOT UNUSUAL TO FEEL LIGHT-HEADED OR FAINT, UP TO 24 HOURS AFTER SURGERY OR WHILE TAKING PAIN MEDICATION.  IF YOU HAVE THESE SYMPTOMS; SIT FOR A FEW MINUTES BEFORE STANDING AND HAVE SOMEONE ASSIST YOU WHEN YOU GET UP TO WALK OR USE THE BATHROOM.    YOU SHOULD REST AND RELAX FOR THE NEXT 24 HOURS AND YOU MUST MAKE ARRANGEMENTS TO HAVE SOMEONE STAY WITH YOU FOR AT LEAST 24 HOURS AFTER YOUR DISCHARGE.  AVOID HAZARDOUS AND STRENUOUS ACTIVITIES.  DO NOT MAKE IMPORTANT DECISIONS FOR 24 HOURS.    DO NOT DRIVE ANY VEHICLE OR OPERATE MECHANICAL EQUIPMENT FOR 24 HOURS FOLLOWING THE END OF YOUR SURGERY.  EVEN THOUGH YOU MAY FEEL NORMAL, YOUR REACTIONS MAY BE AFFECTED BY THE MEDICATION YOU HAVE RECEIVED.    DO NOT DRINK ALCOHOLIC BEVERAGES FOR 24 HOURS FOLLOWING YOUR SURGERY.    DRINK CLEAR LIQUIDS (APPLE JUICE, GINGER ALE, 7-UP, BROTH, ETC.).  PROGRESS TO YOUR REGULAR DIET AS YOU FEEL ABLE.    YOU MAY HAVE A DRY MOUTH, A SORE THROAT, MUSCLES ACHES OR TROUBLE SLEEPING.  THESE SHOULD GO AWAY AFTER 24 HOURS.    CALL YOUR DOCTOR FOR ANY OF THE FOLLOWING:  SIGNS OF INFECTION (FEVER, GROWING TENDERNESS AT THE SURGERY SITE, A LARGE AMOUNT OF DRAINAGE OR BLEEDING, SEVERE PAIN, FOUL-SMELLING DRAINAGE, REDNESS OR SWELLING.    IT HAS BEEN OVER 8 TO 10 HOURS SINCE SURGERY AND YOU ARE STILL NOT ABLE TO URINATE (PASS WATER).       *You have a SCOPOLAMINE PATCH behind your left ear. You can remove this on Saturday 6/20/20, or before if preferred.

## 2020-06-17 NOTE — ANESTHESIA CARE TRANSFER NOTE
Patient: Carrie Germain    Procedure(s):  LAPAROSCOPIC CHOLECYSTECTOMY    Diagnosis: Biliary dyskinesia [K82.8]  Diagnosis Additional Information: No value filed.    Anesthesia Type:   General     Note:  Airway :Face Mask  Patient transferred to:PACU  Comments: VSS. Spontaneous respirations, report given to RNHandoff Report: Identifed the Patient, Identified the Reponsible Provider, Reviewed the pertinent medical history, Discussed the surgical course, Reviewed Intra-OP anesthesia mangement and issues during anesthesia, Set expectations for post-procedure period and Allowed opportunity for questions and acknowledgement of understanding      Vitals: (Last set prior to Anesthesia Care Transfer)    CRNA VITALS  6/17/2020 1017 - 6/17/2020 1053      6/17/2020             NIBP:  104/76    NIBP Mean:  85                Electronically Signed By: ART Cage CRNA  June 17, 2020  10:53 AM

## 2020-06-17 NOTE — ANESTHESIA PREPROCEDURE EVALUATION
Anesthesia Pre-Procedure Evaluation    Patient: Carrie eGrmain   MRN: [id number] : 1970          Preoperative Diagnosis: Biliary dyskinesia [K82.8]    Procedure(s):  LAPAROSCOPIC CHOLECYSTECTOMY    Past Medical History:   Diagnosis Date     Colitis      Diverticulosis      PONV (postoperative nausea and vomiting)      Past Surgical History:   Procedure Laterality Date     COLONOSCOPY       LEEP TX, CERVICAL      1987     Anesthesia Evaluation     . Pt has had prior anesthetic. Type: General    History of anesthetic complications   - PONV        ROS/MED HX    ENT/Pulmonary:  - neg pulmonary ROS    (-) asthma and recent URI   Neurologic:  - neg neurologic ROS    (-) Neuropathy   Cardiovascular:  - neg cardiovascular ROS      (-) hypertension, syncope and arrhythmias   METS/Exercise Tolerance:     Hematologic:  - neg hematologic  ROS       Musculoskeletal:  - neg musculoskeletal ROS       GI/Hepatic:     (+) cholecystitis/cholelithiasis,       Renal/Genitourinary:  - ROS Renal section negative       Endo:  - neg endo ROS       Psychiatric:         Infectious Disease:  - neg infectious disease ROS       Malignancy:      - no malignancy   Other:                          Physical Exam  Normal systems: cardiovascular, pulmonary and dental    Airway   Mallampati: I  TM distance: >3 FB  Neck ROM: full    Dental     Cardiovascular       Pulmonary             Lab Results   Component Value Date    WBC 7.2 2020    HGB 12.2 2020    HCT 37.6 2020     2020     2020    POTASSIUM 4.1 2020    CHLORIDE 108 2020    CO2 24 2020    BUN 10 2020    CR 0.72 2020    GLC 92 2020    LIZ 8.8 2020    ALBUMIN 3.4 2020    PROTTOTAL 7.6 2020    ALT 14 2020    AST 19 2020    ALKPHOS 74 2020    BILITOTAL 0.4 2020    TSH 1.75 2018    T4 1.05 2018       Preop Vitals  BP Readings from Last 3 Encounters:  "  06/17/20 120/79   06/08/20 122/64   04/02/19 112/78    Pulse Readings from Last 3 Encounters:   06/08/20 80   04/02/19 67   02/07/18 72      Resp Readings from Last 3 Encounters:   06/17/20 16   06/08/20 18   02/07/18 12    SpO2 Readings from Last 3 Encounters:   06/17/20 100%   06/08/20 95%   04/02/19 99%      Temp Readings from Last 1 Encounters:   06/17/20 97.6  F (36.4  C) (Temporal)    Ht Readings from Last 1 Encounters:   06/17/20 1.575 m (5' 2\")      Wt Readings from Last 1 Encounters:   06/17/20 59.4 kg (131 lb)    Estimated body mass index is 23.96 kg/m  as calculated from the following:    Height as of this encounter: 1.575 m (5' 2\").    Weight as of this encounter: 59.4 kg (131 lb).       Anesthesia Plan      History & Physical Review  History and physical reviewed and following examination; no interval change.    ASA Status:  2 .    NPO Status:  > 8 hours    Plan for General with Intravenous induction. Maintenance will be TIVA.    PONV prophylaxis:  Ondansetron (or other 5HT-3), Dexamethasone or Solumedrol and Scopolamine patch         Postoperative Care  Postoperative pain management:  IV analgesics, Oral pain medications and Multi-modal analgesia.      Consents  Anesthetic plan, risks, benefits and alternatives discussed with:  Patient..                 Vincent Truong MD                    .  "

## 2020-06-17 NOTE — ANESTHESIA POSTPROCEDURE EVALUATION
Patient: Carrie Germain    Procedure(s):  LAPAROSCOPIC CHOLECYSTECTOMY    Diagnosis:Biliary dyskinesia [K82.8]  Diagnosis Additional Information: No value filed.    Anesthesia Type:  General    Note:  Anesthesia Post Evaluation    Patient location during evaluation: PACU  Patient participation: Able to fully participate in evaluation  Level of consciousness: awake and alert  Pain management: adequate  Airway patency: patent  Cardiovascular status: acceptable  Respiratory status: acceptable  Hydration status: acceptable  PONV: controlled             Last vitals:  Vitals:    06/17/20 1048 06/17/20 1050 06/17/20 1055   BP: 95/62 95/62 (!) 88/66   Pulse:  72 67   Resp: 16 12 15   Temp: 97.8  F (36.6  C)     SpO2: 99%           Electronically Signed By: Vincent Truong MD  June 17, 2020  11:23 AM

## 2020-06-17 NOTE — OP NOTE
General Surgery Operative Note    PREOPERATIVE DIAGNOSIS:  Biliary dyskinesia [K82.8]    POSTOPERATIVE DIAGNOSIS:  Same    PROCEDURE:  Laparoscopic Cholecystectomy    ANESTHESIA:  General    PREOPERATIVE MEDICATIONS:  Ancef IV.    SURGEON:  Shannon Youssef MD    ASSISTANT:  Latha Almazan PA-C    ESTIMATED BLOOD LOSS: 15 ml    INDICATIONS:  Carrie Germain is a 50 year old female who has been experiencing episodes of RUQ and RLQ abdominal pain for the past 6 months. Abdominal imaging has revealed a low gallbladder ejection fraction at 1%, consistent with biliary dyskinesia.  She now presents for laparoscopic cholecystectomy after having risks and benefits reviewed in detail.    PROCEDURE:   An infraumbilical incision was made and the abdomen was entered using a Shavonne trocar technique.  Secondary trocars were placed under laparoscopic guidance.  We proceeded in the usual fashion first finding the gallbladder neck cystic duct junction.  The cystic duct was then identified and cleared of inflammatory adhesions. The cystic artery was similarly identified.  Once a critical window of safety was achieved, the cystic duct was triply clipped and divided.  The cystic artery was also triply clipped and divided. The gallbladder was removed from the gallbladder bed using cautery.  Once free, the gallbladder was extracted from the infraumbilical site in an EndoCatch bag.  The camera was repositioned and the right upper quadrant inspected. Hemostasis was ensured.  Trocar sites were then infiltrated with 0.5% Marcaine plain. The infraumbilical fascial opening was closed with interrupted 0 Vicryl. The skin was closed using 4-0 subcuticular vicryl. Steri-Strips were placed on the incisions. The patient was transferred to recovery in good condition.        INTRAOPERATIVE FINDINGS: Non-inflamed gallbladder    Specimens:   ID Type Source Tests Collected by Time Destination   A : Gallbladder and Contents Tissue Gallbladder and  Contents SURGICAL PATHOLOGY EXAM Shannon Youssef MD 6/17/2020 10:28 AM        Shannon Youssef MD

## 2020-06-18 LAB — COPATH REPORT: NORMAL

## 2020-07-02 ENCOUNTER — MYC MEDICAL ADVICE (OUTPATIENT)
Dept: SURGERY | Facility: CLINIC | Age: 50
End: 2020-07-02

## 2020-07-02 ENCOUNTER — TELEPHONE (OUTPATIENT)
Dept: SURGERY | Facility: CLINIC | Age: 50
End: 2020-07-02

## 2020-07-17 NOTE — TELEPHONE ENCOUNTER
Left message for patient advising her to call to discuss MyChart message in greater detail if she is still experiencing symptoms.    Recommended that she call and schedule a post op visit if symptoms are becoming worse.    If symptoms are diminishing, no need for appointment.    If symptoms are unchanged, recommend phone call to triage nurse to discuss further.    Call back number provided.    Maribel Alex, NAY-BSN

## 2020-09-16 ENCOUNTER — TRANSFERRED RECORDS (OUTPATIENT)
Dept: HEALTH INFORMATION MANAGEMENT | Facility: CLINIC | Age: 50
End: 2020-09-16

## 2020-09-21 ENCOUNTER — TELEPHONE (OUTPATIENT)
Dept: FAMILY MEDICINE | Facility: CLINIC | Age: 50
End: 2020-09-21

## 2020-09-21 ENCOUNTER — TRANSFERRED RECORDS (OUTPATIENT)
Dept: HEALTH INFORMATION MANAGEMENT | Facility: CLINIC | Age: 50
End: 2020-09-21

## 2020-09-21 DIAGNOSIS — Z12.31 ENCOUNTER FOR SCREENING MAMMOGRAM FOR BREAST CANCER: ICD-10-CM

## 2020-09-21 PROCEDURE — 77067 SCR MAMMO BI INCL CAD: CPT | Mod: TC

## 2020-09-21 PROCEDURE — 77063 BREAST TOMOSYNTHESIS BI: CPT | Mod: TC

## 2020-09-21 NOTE — TELEPHONE ENCOUNTER
Order/Referral Request:    Who is requesting: Carrie    Orders being requested: Referral to oncology    Reason service is needed/diagnosis: per radiologist after an mri    When are orders needed by: asap    Has this been discussed with Provider: Yes    Does patient have a preference on a Group/Provider/Facility?     Does patient have an appointment scheduled: No    Where to send Orders:     Okay to leave detailed message?  Yes at Home number on file [phone] (home)    Routing

## 2020-09-22 ENCOUNTER — TELEPHONE (OUTPATIENT)
Dept: ONCOLOGY | Facility: CLINIC | Age: 50
End: 2020-09-22

## 2020-09-22 ENCOUNTER — MYC MEDICAL ADVICE (OUTPATIENT)
Dept: PEDIATRICS | Facility: CLINIC | Age: 50
End: 2020-09-22

## 2020-09-22 DIAGNOSIS — C79.51 OSSEOUS METASTASIS: Primary | ICD-10-CM

## 2020-09-22 NOTE — TELEPHONE ENCOUNTER
This writer called patient. Left voice message to call clinic to schedule VRT phone visit to discuss referral.       Osiris Mcdowell MA// September 22, 2020 8:28 AM

## 2020-09-22 NOTE — TELEPHONE ENCOUNTER
I need more information. I don't see an MRI in her record so I am not sure what she is referring to. I wonder if a phone visit would be best to discuss this. I have availability this week.     Thanks,   Amy Cárdenas, DNP, APRN, CNP

## 2020-09-22 NOTE — TELEPHONE ENCOUNTER
She could also do an e-visit! I am happy to enter a referral for her I just need a little bit more information.     Amy Cárdenas, DNP, APRN, CNP

## 2020-09-22 NOTE — TELEPHONE ENCOUNTER
Pt called requesting to speak to Kelsea for update. Informed pt, Kelsea on lunch and will call after she returns.    Jami Adler on 9/22/2020 at 12:26 PM

## 2020-09-22 NOTE — TELEPHONE ENCOUNTER
Called patient to give referral information, no answer. LVM with referral information to call and schedule.     Kelsea Reddy, EMT at 12:48 PM on September 22, 2020  [facility] Health Guide   [phone]

## 2020-09-22 NOTE — TELEPHONE ENCOUNTER
Patient sent over report, waiting on scans.    Patient would really like an oncology referral ASAP.     Kelsea Reddy, EMT at 10:09 AM on September 22, 2020  [facility] Health Guide   [phone]

## 2020-09-22 NOTE — TELEPHONE ENCOUNTER
Patient called and is requesting a suggestion for a referral. Patient stated she had a MRI completed that showed spots by her spine. She was instructed to go to oncology immediately. Huddled with extender. Patient was informed that we need the MRI to be able to give her a specific recommendation. Patient was informed we would get back to her by the end of the day. Patient requested to be contacted sooner. Patient felt like she shouldn't have to wait till the end of the day. Informed patient I would get to it as soon as I could.     Called [facility] to get MRI pushed to our records. They are going to push all of the patient's imaging right away. Sated it shouldn't take long.      Kelsea Reddy, EMT at 8:50 AM on September 22, 2020  [facility] Health Guide   [phone]

## 2020-09-22 NOTE — TELEPHONE ENCOUNTER
Noted in chart that patient called oncology, will close encounter.     Kelsea Reddy, EMT at 3:18 PM on September 22, 2020  [facility] Guide   [phone]

## 2020-09-23 NOTE — TELEPHONE ENCOUNTER
ONCOLOGY INTAKE: Records Information      APPT INFORMATION:  Referring provider:  Nevin Mojica NP  Referring provider s clinic:  NA  Reason for visit/diagnosis: Osseous metastasis   Has patient been notified of appointment date and time?: Yes    RECORDS INFORMATION:  Were the records received with the referral (via [employer])? No    Has patient been seen for any external appt for this diagnosis? No    If yes, where? NA        ADDITIONAL INFORMATION:  None

## 2020-09-24 ENCOUNTER — E-VISIT (OUTPATIENT)
Dept: PEDIATRICS | Facility: CLINIC | Age: 50
End: 2020-09-24
Payer: COMMERCIAL

## 2020-09-24 ENCOUNTER — MYC MEDICAL ADVICE (OUTPATIENT)
Dept: PEDIATRICS | Facility: CLINIC | Age: 50
End: 2020-09-24

## 2020-09-24 DIAGNOSIS — C79.51 OSSEOUS METASTASIS: Primary | ICD-10-CM

## 2020-09-24 PROCEDURE — 99422 OL DIG E/M SVC 11-20 MIN: CPT | Performed by: NURSE PRACTITIONER

## 2020-09-24 RX ORDER — OXYCODONE HYDROCHLORIDE 5 MG/1
5-10 TABLET ORAL EVERY 6 HOURS PRN
Qty: 12 TABLET | Refills: 0 | Status: SHIPPED | OUTPATIENT
Start: 2020-09-24 | End: 2020-09-27

## 2020-09-24 RX ORDER — ONDANSETRON 4 MG/1
4 TABLET, ORALLY DISINTEGRATING ORAL EVERY 8 HOURS PRN
Qty: 15 TABLET | Refills: 0 | Status: SHIPPED | OUTPATIENT
Start: 2020-09-24

## 2020-09-24 NOTE — TELEPHONE ENCOUNTER
Action    Action Taken 9/24/20:     -Imaging from [facility] previously uploaded to PACS, however no reports.    -Spoke w/ [facility]/Dr. Cross's Office. They will fax reports & Office Notes. Per them, pt's XR reports are dictated in Dr. Cross's notes.  9:43 AM    -Recs/Reports from [facility] - sent to HIM for upload       RECORDS STATUS - ALL OTHER DIAGNOSIS      RECORDS RECEIVED FROM: [facility]   DATE RECEIVED:    NOTES STATUS DETAILS   OFFICE NOTE from referring provider Nevin Vo NP    Also seen:    Dr. Deon Cross ([facility]) - Faxed to HIM 9/24   OFFICE NOTE from medical oncologist     DISCHARGE SUMMARY from hospital [facility] 6/17/20   DISCHARGE REPORT from the ER     OPERATIVE REPORT [facility] 6/17/20: Laparoscopic Cholecystectomy   MEDICATION LIST [facility]    CLINICAL TRIAL TREATMENTS TO DATE     LABS     PATHOLOGY REPORTS [facility] 6/17/20: Surg Path   ANYTHING RELATED TO DIAGNOSIS     GENONOMIC TESTING     TYPE:     IMAGING (NEED IMAGES & REPORT)     XR PACS 9/16/20: [facility] - Reports requested 9/24   CT SCANS     MRI PACS 9/21/20: [facility] - Reports requested 9/24   MAMMO     ULTRASOUND     PET

## 2020-09-24 NOTE — TELEPHONE ENCOUNTER
Provider E-Visit time total (minutes): 15 minutes was spent in chart review, medication review, and running drug interactions.     Amy Cárdenas, DNP, APRN, CNP

## 2020-09-25 ENCOUNTER — MYC MEDICAL ADVICE (OUTPATIENT)
Dept: PEDIATRICS | Facility: CLINIC | Age: 50
End: 2020-09-25

## 2020-09-28 ENCOUNTER — PRE VISIT (OUTPATIENT)
Facility: CLINIC | Age: 50
End: 2020-09-28

## 2020-09-28 ENCOUNTER — ONCOLOGY VISIT (OUTPATIENT)
Dept: ONCOLOGY | Facility: CLINIC | Age: 50
End: 2020-09-28
Attending: NURSE PRACTITIONER
Payer: COMMERCIAL

## 2020-09-28 VITALS
HEART RATE: 92 BPM | RESPIRATION RATE: 16 BRPM | BODY MASS INDEX: 24.53 KG/M2 | TEMPERATURE: 98.7 F | HEIGHT: 62 IN | DIASTOLIC BLOOD PRESSURE: 81 MMHG | OXYGEN SATURATION: 100 % | SYSTOLIC BLOOD PRESSURE: 122 MMHG | WEIGHT: 133.3 LBS

## 2020-09-28 DIAGNOSIS — C79.51 OSSEOUS METASTASIS: ICD-10-CM

## 2020-09-28 LAB
ALBUMIN SERPL-MCNC: 3.5 G/DL (ref 3.4–5)
ALP SERPL-CCNC: 91 U/L (ref 40–150)
ALT SERPL W P-5'-P-CCNC: 28 U/L (ref 0–50)
ANION GAP SERPL CALCULATED.3IONS-SCNC: 6 MMOL/L (ref 3–14)
AST SERPL W P-5'-P-CCNC: 28 U/L (ref 0–45)
BASOPHILS # BLD AUTO: 0.1 10E9/L (ref 0–0.2)
BASOPHILS NFR BLD AUTO: 0.5 %
BILIRUB SERPL-MCNC: 0.3 MG/DL (ref 0.2–1.3)
BUN SERPL-MCNC: 12 MG/DL (ref 7–30)
CALCIUM SERPL-MCNC: 9.3 MG/DL (ref 8.5–10.1)
CHLORIDE SERPL-SCNC: 100 MMOL/L (ref 94–109)
CO2 SERPL-SCNC: 28 MMOL/L (ref 20–32)
CREAT SERPL-MCNC: 0.82 MG/DL (ref 0.52–1.04)
DIFFERENTIAL METHOD BLD: NORMAL
EOSINOPHIL # BLD AUTO: 0.1 10E9/L (ref 0–0.7)
EOSINOPHIL NFR BLD AUTO: 1.4 %
ERYTHROCYTE [DISTWIDTH] IN BLOOD BY AUTOMATED COUNT: 13.7 % (ref 10–15)
GFR SERPL CREATININE-BSD FRML MDRD: 84 ML/MIN/{1.73_M2}
GLUCOSE SERPL-MCNC: 96 MG/DL (ref 70–99)
HCT VFR BLD AUTO: 37.9 % (ref 35–47)
HGB BLD-MCNC: 12.4 G/DL (ref 11.7–15.7)
IMM GRANULOCYTES # BLD: 0 10E9/L (ref 0–0.4)
IMM GRANULOCYTES NFR BLD: 0.4 %
LYMPHOCYTES # BLD AUTO: 2.3 10E9/L (ref 0.8–5.3)
LYMPHOCYTES NFR BLD AUTO: 22.7 %
MCH RBC QN AUTO: 30.8 PG (ref 26.5–33)
MCHC RBC AUTO-ENTMCNC: 32.7 G/DL (ref 31.5–36.5)
MCV RBC AUTO: 94 FL (ref 78–100)
MONOCYTES # BLD AUTO: 1 10E9/L (ref 0–1.3)
MONOCYTES NFR BLD AUTO: 9.6 %
NEUTROPHILS # BLD AUTO: 6.8 10E9/L (ref 1.6–8.3)
NEUTROPHILS NFR BLD AUTO: 65.4 %
NRBC # BLD AUTO: 0 10*3/UL
NRBC BLD AUTO-RTO: 0 /100
PLATELET # BLD AUTO: 223 10E9/L (ref 150–450)
POTASSIUM SERPL-SCNC: 4.1 MMOL/L (ref 3.4–5.3)
PROT SERPL-MCNC: 7.2 G/DL (ref 6.8–8.8)
RBC # BLD AUTO: 4.02 10E12/L (ref 3.8–5.2)
SODIUM SERPL-SCNC: 133 MMOL/L (ref 133–144)
WBC # BLD AUTO: 10.3 10E9/L (ref 4–11)

## 2020-09-28 PROCEDURE — 99205 OFFICE O/P NEW HI 60 MIN: CPT | Mod: ZP | Performed by: INTERNAL MEDICINE

## 2020-09-28 PROCEDURE — 80053 COMPREHEN METABOLIC PANEL: CPT | Performed by: INTERNAL MEDICINE

## 2020-09-28 PROCEDURE — 00000402 ZZHCL STATISTIC TOTAL PROTEIN: Performed by: INTERNAL MEDICINE

## 2020-09-28 PROCEDURE — 85025 COMPLETE CBC W/AUTO DIFF WBC: CPT | Performed by: INTERNAL MEDICINE

## 2020-09-28 PROCEDURE — 84165 PROTEIN E-PHORESIS SERUM: CPT | Performed by: INTERNAL MEDICINE

## 2020-09-28 PROCEDURE — G0463 HOSPITAL OUTPT CLINIC VISIT: HCPCS

## 2020-09-28 PROCEDURE — 36415 COLL VENOUS BLD VENIPUNCTURE: CPT

## 2020-09-28 RX ORDER — CYCLOBENZAPRINE HCL 5 MG
5 TABLET ORAL 3 TIMES DAILY PRN
Qty: 60 TABLET | Refills: 1 | Status: SHIPPED | OUTPATIENT
Start: 2020-09-28

## 2020-09-28 RX ORDER — HYDROCODONE BITARTRATE AND ACETAMINOPHEN 5; 325 MG/1; MG/1
1 TABLET ORAL EVERY 6 HOURS PRN
Qty: 40 TABLET | Refills: 0 | Status: SHIPPED | OUTPATIENT
Start: 2020-09-28

## 2020-09-28 ASSESSMENT — PAIN SCALES - GENERAL: PAINLEVEL: MODERATE PAIN (5)

## 2020-09-28 ASSESSMENT — MIFFLIN-ST. JEOR: SCORE: 1179.89

## 2020-09-28 NOTE — LETTER
9/28/2020     RE: Carrie Germain  [address] [place] [address]    Dear Colleague,    Thank you for referring your patient, Carrie Germain, to the [facility]. Please see a copy of my visit note below.    Chief Complaint   Patient presents with     Blood Draw     Vitals and blood drawn via VPT by LPN. Pt checked into dalia.      ZACKARY Chaves LPN    [facility]  MEDICAL ONCOLOGY    NEW PATIENT VISIT NOTE    Reason for consultation: abnormal MRI    Referring Provider: ART Wilson    Oncology Treatment Summary  1. 9-10 month history of back pain, migratory throughout back, right lower ribcage  2. Thought might be related to gallbladder, HIDA scan 1% EF, June 2020 ruslan   3. 9/21/2020 MRI at [facility] T-L spine with diffuse T2/STIR hypointense T1 osseous lesions throughout thoracic and lumbar spine with T6 acute to subacute compression fracture.      HISTORY OF PRESENTING ILLNESS  Very pleasant 49 yo with above history an abnormal MRI findings. She notes retrospectively that her pain started about 11 months ago and was migratory at first. It bothered her throughout her back at first then started to bother her right anterior rib and was quite bothersome. She went to the chiropractor and everytime she went she noted her pain got better. Eventually by June it wasn't improving. She and her PCP thought it might be her gallbladder and she had a HIDA scan that was abnormal with a 1% EF. She had her gallbladder removed in June and her pain was good for a few weeks and then worsened again. She then got a referral to ortho and saw Dr Cross at [facility]. Plain films were done that were unremarkable, so an MRI was ordered. This showed what appeared to be diffuse metastasis. She was thus referred. She has no other complaints, her appetite is fine, she has gain a few pounds in the last few months. No abnormal vaginal bleeding, no n/v/d/c/melena/BRBPR. No other symptoms. She last had a  colonoscopy 4 years ago and has had benign polyps removed in the past. She has a hx of ASCUS and had a LEEP procedure in the past but gets annual pelvic exams and last one was normal. Mammogram 9/21/2020 was normal. She sees dermatology annually and has never had a skin cancer or melanoma.     PAST MEDICAL HISTORY  Deja, diverticulosis, LEEP      CURRENT OUTPATIENT MEDICATIONS  Current Outpatient Medications   Medication     cyclobenzaprine (FLEXERIL) 5 MG tablet     HYDROcodone-acetaminophen (NORCO) 5-325 MG tablet     acetaminophen (TYLENOL) 325 MG tablet     cetirizine (ZYRTEC) 10 MG tablet     ibuprofen (ADVIL/MOTRIN) 600 MG tablet     MICROGESTIN 1-20 MG-MCG tablet     Multiple Vitamins-Minerals (MULTIVITAMIN ADULTS PO)     ondansetron (ZOFRAN-ODT) 4 MG ODT tab     senna-docusate (SENOKOT-S/PERICOLACE) 8.6-50 MG tablet     No current facility-administered medications for this visit.         ALLERGIES     Allergies   Allergen Reactions     Doxycycline Palpitations     Hives           SOCIAL HISTORY  , lives with her  shanika in [place]. 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     FAMILY HISTORY  Father prostate cancer at 65, mom breast cancer at 72, MGM breast cancer in 50s, m aunt breast cancer in 60s     REVIEW OF SYSTEMS  Review Of Systems  Skin: negative  Eyes: negative  Ears/Nose/Throat: negative  Respiratory: No shortness of breath, dyspnea on exertion, cough, or hemoptysis  Cardiovascular: negative  Gastrointestinal: negative  Genitourinary: negative  Musculoskeletal: negative  Neurologic: negative  Psychiatric: negative  Hematologic/Lymphatic/Immunologic: negative  Endocrine: negative      PHYSICAL EXAM  B/P: 122/81, T: 98.7, P: 92, R: 16  Wt Readings from Last 3 Encounters:   09/28/20 60.5 kg (133 lb 4.8 oz)   06/17/20 59.4 kg (131 lb)   06/08/20 59.5 kg (131 lb 1.6  oz)       ECOG PPS1    Physical Exam  Vitals signs reviewed.   Constitutional:       Appearance: Normal appearance. She is normal weight.   HENT:      Head: Normocephalic and atraumatic.      Nose: Nose normal.      Mouth/Throat:      Mouth: Mucous membranes are moist.   Eyes:      Extraocular Movements: Extraocular movements intact.      Conjunctiva/sclera: Conjunctivae normal.      Pupils: Pupils are equal, round, and reactive to light.   Neck:      Musculoskeletal: Normal range of motion and neck supple.   Cardiovascular:      Rate and Rhythm: Normal rate and regular rhythm.      Heart sounds: Normal heart sounds.   Pulmonary:      Effort: Pulmonary effort is normal.      Breath sounds: Normal breath sounds.   Abdominal:      General: Abdomen is flat. Bowel sounds are normal.      Palpations: Abdomen is soft.   Musculoskeletal: Normal range of motion.      Comments: Tenderness over right anterior rib   Neurological:      General: No focal deficit present.      Mental Status: She is alert and oriented to person, place, and time. Mental status is at baseline.   Psychiatric:         Mood and Affect: Mood normal.         Behavior: Behavior normal.         Thought Content: Thought content normal.         Judgment: Judgment normal.              LABORATORY AND IMAGING STUDIES  Recent Labs   Lab Test 09/28/20  1408 06/08/20  1140 08/18/15  0735    138 137   POTASSIUM 4.1 4.1 4.3   CHLORIDE 100 108 105   CO2 28 24 28   ANIONGAP 6 6 4   BUN 12 10 11   CR 0.82 0.72 0.71   GLC 96 92 76   LIZ PENDING 8.8 8.5     No results for input(s): MAG, PHOS in the last 47256 hours.  Recent Labs   Lab Test 09/28/20  1408 06/08/20  1140   WBC 10.3 7.2   HGB 12.4 12.2    255   MCV 94 96   NEUTROPHIL 65.4  --      Recent Labs   Lab Test 09/28/20  1408 06/08/20  1140 08/18/15  0735   BILITOTAL 0.3 0.4 0.4   ALKPHOS PENDING 74 51   ALT PENDING 14 13   AST 28 19 13   ALBUMIN 3.5 3.4 3.4     TSH   Date Value Ref Range Status    02/07/2018 1.75 0.40 - 4.00 mU/L Final   08/18/2015 2.11 0.40 - 4.00 mU/L Final     No results for input(s): CEA in the last 83066 hours.  Results for orders placed or performed in visit on 09/21/20   MA Screen Bilateral w/Eddie    Narrative    SCREENING MAMMOGRAM, BILATERAL, DIGITAL w/CAD AND TOMOSYNTHESIS -  9/21/2020 12:23 PM    BREAST SYMPTOMS: No current breast complaints.     COMPARISON:  04/15/2019, 06/07/2016, 11/18/2014, 04/08/2013.    BREAST DENSITY: Heterogeneously dense.    COMMENTS: No findings of suspicion for malignancy.       Impression    IMPRESSION: BI-RADS CATEGORY: 1 -  Negative    RECOMMENDED FOLLOW-UP: Annual Mammography.    Exam results letter mailed to patient.      SATISH CHARLES MD        ASSESSMENT  AND RECOMMENDATIONS:    1. Abnormal MRI with what appears to be diffuse oseous metastasis with recent normal mammogram, colonoscopy and pelvic exam    Plan    I reviewed the MRI results with the patient and her , at this point it does appear that she has diffuse oseous metastasis throughout her thoracic and lumbar spine. We discussed potential sites of cancer in a 49 yo tends to occur from including, breast cancer, lung cancer, cervical cancer, melanoma. Other potential options could include a myeloma or NHL. It is curious given her otherwise lack of systemic symptoms and good upkeep of healthcare maintenance that this is occurring. She has no other symptoms or signs from potential sources. At this point we discussed doing baseline lab, CT CAP and obtaining a bone biopsy.    Areli Monte MD on 9/28/2020 at 4:47 PM

## 2020-09-28 NOTE — NURSING NOTE
"Oncology Rooming Note    September 28, 2020 2:57 PM   Carrie Germain is a 50 year old female who presents for:    Chief Complaint   Patient presents with     Blood Draw     Vitals and blood drawn via VPT by LPN. Pt checked into appt.      Initial Vitals: Blood Pressure 122/81   Pulse 92   Temperature 98.7  F (37.1  C) (Oral)   Respiration 16   Height 1.57 m (5' 1.81\")   Weight 60.5 kg (133 lb 4.8 oz)   Oxygen Saturation 100%   Body Mass Index 24.53 kg/m   Estimated body mass index is 24.53 kg/m  as calculated from the following:    Height as of this encounter: 1.57 m (5' 1.81\").    Weight as of this encounter: 60.5 kg (133 lb 4.8 oz). Body surface area is 1.62 meters squared.  Moderate Pain (5) Comment: Data Unavailable   No LMP recorded.  Allergies reviewed: Yes  Medications reviewed: Yes    Medications: Medication refills not needed today.  Pharmacy name entered into [employer]: [id number] - [place], [place] - [address] AT [address]    Clinical concerns: none       Liat Godfrey MA            "

## 2020-09-28 NOTE — PROGRESS NOTES
Chief Complaint   Patient presents with     Blood Draw     Vitals and blood drawn via VPT by LPN. Pt checked into rosie Chaves LPN    [facility]  MEDICAL ONCOLOGY    NEW PATIENT VISIT NOTE    Reason for consultation: abnormal MRI    Referring Provider: ART Wilson    Oncology Treatment Summary  1. 9-10 month history of back pain, migratory throughout back, right lower ribcage  2. Thought might be related to gallbladder, HIDA scan 1% EF, June 2020 ruslan   3. 9/21/2020 MRI at [facility] T-L spine with diffuse T2/STIR hypointense T1 osseous lesions throughout thoracic and lumbar spine with T6 acute to subacute compression fracture.      HISTORY OF PRESENTING ILLNESS  Very pleasant 49 yo with above history an abnormal MRI findings. She notes retrospectively that her pain started about 11 months ago and was migratory at first. It bothered her throughout her back at first then started to bother her right anterior rib and was quite bothersome. She went to the chiropractor and everytime she went she noted her pain got better. Eventually by June it wasn't improving. She and her PCP thought it might be her gallbladder and she had a HIDA scan that was abnormal with a 1% EF. She had her gallbladder removed in June and her pain was good for a few weeks and then worsened again. She then got a referral to ortho and saw Dr Cross at [facility]. Plain films were done that were unremarkable, so an MRI was ordered. This showed what appeared to be diffuse metastasis. She was thus referred. She has no other complaints, her appetite is fine, she has gain a few pounds in the last few months. No abnormal vaginal bleeding, no n/v/d/c/melena/BRBPR. No other symptoms. She last had a colonoscopy 4 years ago and has had benign polyps removed in the past. She has a hx of ASCUS and had a LEEP procedure in the past but gets annual pelvic exams and last one was normal. Mammogram 9/21/2020 was normal. She sees dermatology  annually and has never had a skin cancer or melanoma.     PAST MEDICAL HISTORY  Deja, diverticulosis, LEEP      CURRENT OUTPATIENT MEDICATIONS  Current Outpatient Medications   Medication     cyclobenzaprine (FLEXERIL) 5 MG tablet     HYDROcodone-acetaminophen (NORCO) 5-325 MG tablet     acetaminophen (TYLENOL) 325 MG tablet     cetirizine (ZYRTEC) 10 MG tablet     ibuprofen (ADVIL/MOTRIN) 600 MG tablet     MICROGESTIN 1-20 MG-MCG tablet     Multiple Vitamins-Minerals (MULTIVITAMIN ADULTS PO)     ondansetron (ZOFRAN-ODT) 4 MG ODT tab     senna-docusate (SENOKOT-S/PERICOLACE) 8.6-50 MG tablet     No current facility-administered medications for this visit.         ALLERGIES     Allergies   Allergen Reactions     Doxycycline Palpitations     Hives           SOCIAL HISTORY  , lives with her  shanika in [place]. 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     FAMILY HISTORY  Father prostate cancer at 65, mom breast cancer at 72, MGM breast cancer in 50s, m aunt breast cancer in 60s     REVIEW OF SYSTEMS  Review Of Systems  Skin: negative  Eyes: negative  Ears/Nose/Throat: negative  Respiratory: No shortness of breath, dyspnea on exertion, cough, or hemoptysis  Cardiovascular: negative  Gastrointestinal: negative  Genitourinary: negative  Musculoskeletal: negative  Neurologic: negative  Psychiatric: negative  Hematologic/Lymphatic/Immunologic: negative  Endocrine: negative      PHYSICAL EXAM  B/P: 122/81, T: 98.7, P: 92, R: 16  Wt Readings from Last 3 Encounters:   09/28/20 60.5 kg (133 lb 4.8 oz)   06/17/20 59.4 kg (131 lb)   06/08/20 59.5 kg (131 lb 1.6 oz)       ECOG PPS1    Physical Exam  Vitals signs reviewed.   Constitutional:       Appearance: Normal appearance. She is normal weight.   HENT:      Head: Normocephalic and atraumatic.      Nose: Nose normal.      Mouth/Throat:       Mouth: Mucous membranes are moist.   Eyes:      Extraocular Movements: Extraocular movements intact.      Conjunctiva/sclera: Conjunctivae normal.      Pupils: Pupils are equal, round, and reactive to light.   Neck:      Musculoskeletal: Normal range of motion and neck supple.   Cardiovascular:      Rate and Rhythm: Normal rate and regular rhythm.      Heart sounds: Normal heart sounds.   Pulmonary:      Effort: Pulmonary effort is normal.      Breath sounds: Normal breath sounds.   Abdominal:      General: Abdomen is flat. Bowel sounds are normal.      Palpations: Abdomen is soft.   Musculoskeletal: Normal range of motion.      Comments: Tenderness over right anterior rib   Neurological:      General: No focal deficit present.      Mental Status: She is alert and oriented to person, place, and time. Mental status is at baseline.   Psychiatric:         Mood and Affect: Mood normal.         Behavior: Behavior normal.         Thought Content: Thought content normal.         Judgment: Judgment normal.              LABORATORY AND IMAGING STUDIES  Recent Labs   Lab Test 09/28/20  1408 06/08/20  1140 08/18/15  0735    138 137   POTASSIUM 4.1 4.1 4.3   CHLORIDE 100 108 105   CO2 28 24 28   ANIONGAP 6 6 4   BUN 12 10 11   CR 0.82 0.72 0.71   GLC 96 92 76   LIZ PENDING 8.8 8.5     No results for input(s): MAG, PHOS in the last 40943 hours.  Recent Labs   Lab Test 09/28/20  1408 06/08/20  1140   WBC 10.3 7.2   HGB 12.4 12.2    255   MCV 94 96   NEUTROPHIL 65.4  --      Recent Labs   Lab Test 09/28/20  1408 06/08/20  1140 08/18/15  0735   BILITOTAL 0.3 0.4 0.4   ALKPHOS PENDING 74 51   ALT PENDING 14 13   AST 28 19 13   ALBUMIN 3.5 3.4 3.4     TSH   Date Value Ref Range Status   02/07/2018 1.75 0.40 - 4.00 mU/L Final   08/18/2015 2.11 0.40 - 4.00 mU/L Final     No results for input(s): CEA in the last 97212 hours.  Results for orders placed or performed in visit on 09/21/20   MA Screen Bilateral w/Eddie     Narrative    SCREENING MAMMOGRAM, BILATERAL, DIGITAL w/CAD AND TOMOSYNTHESIS -  9/21/2020 12:23 PM    BREAST SYMPTOMS: No current breast complaints.     COMPARISON:  04/15/2019, 06/07/2016, 11/18/2014, 04/08/2013.    BREAST DENSITY: Heterogeneously dense.    COMMENTS: No findings of suspicion for malignancy.       Impression    IMPRESSION: BI-RADS CATEGORY: 1 -  Negative    RECOMMENDED FOLLOW-UP: Annual Mammography.    Exam results letter mailed to patient.      SATISH CHARLES MD        ASSESSMENT  AND RECOMMENDATIONS:    1. Abnormal MRI with what appears to be diffuse oseous metastasis with recent normal mammogram, colonoscopy and pelvic exam    Plan    I reviewed the MRI results with the patient and her , at this point it does appear that she has diffuse oseous metastasis throughout her thoracic and lumbar spine. We discussed potential sites of cancer in a 49 yo tends to occur from including, breast cancer, lung cancer, cervical cancer, melanoma. Other potential options could include a myeloma or NHL. It is curious given her otherwise lack of systemic symptoms and good upkeep of healthcare maintenance that this is occurring. She has no other symptoms or signs from potential sources. At this point we discussed doing baseline lab, CT CAP and obtaining a bone biopsy.    Areli Monte MD on 9/28/2020 at 4:47 PM

## 2020-09-29 LAB
ALBUMIN SERPL ELPH-MCNC: 3.8 G/DL (ref 3.7–5.1)
ALPHA1 GLOB SERPL ELPH-MCNC: 0.4 G/DL (ref 0.2–0.4)
ALPHA2 GLOB SERPL ELPH-MCNC: 1 G/DL (ref 0.5–0.9)
B-GLOBULIN SERPL ELPH-MCNC: 0.8 G/DL (ref 0.6–1)
GAMMA GLOB SERPL ELPH-MCNC: 0.8 G/DL (ref 0.7–1.6)
M PROTEIN SERPL ELPH-MCNC: 0 G/DL
PROT PATTERN SERPL ELPH-IMP: ABNORMAL

## 2020-09-30 ENCOUNTER — HOSPITAL ENCOUNTER (OUTPATIENT)
Facility: CLINIC | Age: 50
End: 2020-09-30
Admitting: INTERNAL MEDICINE
Payer: COMMERCIAL

## 2020-09-30 DIAGNOSIS — M89.9 BONE LESION: Primary | ICD-10-CM

## 2020-09-30 DIAGNOSIS — Z11.59 ENCOUNTER FOR SCREENING FOR OTHER VIRAL DISEASES: Primary | ICD-10-CM

## 2020-10-02 ENCOUNTER — TRANSFERRED RECORDS (OUTPATIENT)
Dept: HEALTH INFORMATION MANAGEMENT | Facility: CLINIC | Age: 50
End: 2020-10-02

## 2020-10-02 LAB
CREAT SERPL-MCNC: 0.71 MG/DL (ref 0.59–1.04)
GFR SERPL CREATININE-BSD FRML MDRD: >90 ML/MIN/BSA
GLUCOSE SERPL-MCNC: 111 MG/DL (ref 70–140)
POTASSIUM SERPL-SCNC: 3.9 MMOL/L (ref 3.6–5.2)

## 2020-10-05 ENCOUNTER — TRANSFERRED RECORDS (OUTPATIENT)
Dept: HEALTH INFORMATION MANAGEMENT | Facility: CLINIC | Age: 50
End: 2020-10-05

## 2020-10-06 ENCOUNTER — PATIENT OUTREACH (OUTPATIENT)
Dept: ONCOLOGY | Facility: CLINIC | Age: 50
End: 2020-10-06

## 2020-10-06 ENCOUNTER — MYC MEDICAL ADVICE (OUTPATIENT)
Dept: PEDIATRICS | Facility: CLINIC | Age: 50
End: 2020-10-06

## 2020-10-06 NOTE — PROGRESS NOTES
Received call from Carrie. She stated she is currently receiving treatment at [facility]. She stated she became frustrated with the amount of time it was taking for things to get scheduled at [facility] and felt [facility] was more aggressive towards determining her diagnosis and plan of care.     Carrie stated she may wish to return to meet with Dr. Monte in the near future to discuss [facility]'s recommendations and get a second opinion. Encouraged Carrie to call when she was ready to schedule an appointment. Provided contact information for scheduling line.      ZANDER Marquis, RN  RN Care Coordinator  [facility]

## 2020-10-09 ENCOUNTER — TRANSFERRED RECORDS (OUTPATIENT)
Dept: HEALTH INFORMATION MANAGEMENT | Facility: CLINIC | Age: 50
End: 2020-10-09

## 2020-10-12 ENCOUNTER — TRANSFERRED RECORDS (OUTPATIENT)
Dept: HEALTH INFORMATION MANAGEMENT | Facility: CLINIC | Age: 50
End: 2020-10-12

## 2020-10-15 ENCOUNTER — TRANSFERRED RECORDS (OUTPATIENT)
Dept: HEALTH INFORMATION MANAGEMENT | Facility: CLINIC | Age: 50
End: 2020-10-15

## 2020-10-20 ENCOUNTER — TRANSFERRED RECORDS (OUTPATIENT)
Dept: HEALTH INFORMATION MANAGEMENT | Facility: CLINIC | Age: 50
End: 2020-10-20

## 2020-10-21 ENCOUNTER — MYC MEDICAL ADVICE (OUTPATIENT)
Dept: PEDIATRICS | Facility: CLINIC | Age: 50
End: 2020-10-21

## 2020-10-27 ENCOUNTER — TRANSFERRED RECORDS (OUTPATIENT)
Dept: HEALTH INFORMATION MANAGEMENT | Facility: CLINIC | Age: 50
End: 2020-10-27

## 2020-11-14 ENCOUNTER — HEALTH MAINTENANCE LETTER (OUTPATIENT)
Age: 50
End: 2020-11-14

## 2020-12-04 ENCOUNTER — TRANSFERRED RECORDS (OUTPATIENT)
Dept: HEALTH INFORMATION MANAGEMENT | Facility: CLINIC | Age: 50
End: 2020-12-04

## 2021-02-10 ENCOUNTER — TRANSFERRED RECORDS (OUTPATIENT)
Dept: HEALTH INFORMATION MANAGEMENT | Facility: CLINIC | Age: 51
End: 2021-02-10

## 2021-02-11 ENCOUNTER — TRANSFERRED RECORDS (OUTPATIENT)
Dept: HEALTH INFORMATION MANAGEMENT | Facility: CLINIC | Age: 51
End: 2021-02-11

## 2021-03-04 ENCOUNTER — TRANSFERRED RECORDS (OUTPATIENT)
Dept: HEALTH INFORMATION MANAGEMENT | Facility: CLINIC | Age: 51
End: 2021-03-04

## 2021-03-12 ENCOUNTER — TRANSFERRED RECORDS (OUTPATIENT)
Dept: HEALTH INFORMATION MANAGEMENT | Facility: CLINIC | Age: 51
End: 2021-03-12

## 2021-06-14 ENCOUNTER — TRANSFERRED RECORDS (OUTPATIENT)
Dept: HEALTH INFORMATION MANAGEMENT | Facility: CLINIC | Age: 51
End: 2021-06-14

## 2021-09-12 ENCOUNTER — HEALTH MAINTENANCE LETTER (OUTPATIENT)
Age: 51
End: 2021-09-12

## 2022-01-02 ENCOUNTER — HEALTH MAINTENANCE LETTER (OUTPATIENT)
Age: 52
End: 2022-01-02

## 2022-08-02 NOTE — TELEPHONE ENCOUNTER
Attempted to call patient for post op check.  No answer.  Message was left for patient to call back if they had any questions of concerns.     Latha Almazan PA-C   Ok thanks.

## 2022-11-19 ENCOUNTER — HEALTH MAINTENANCE LETTER (OUTPATIENT)
Age: 52
End: 2022-11-19

## 2023-04-09 ENCOUNTER — HEALTH MAINTENANCE LETTER (OUTPATIENT)
Age: 53
End: 2023-04-09

## 2025-02-01 ENCOUNTER — HEALTH MAINTENANCE LETTER (OUTPATIENT)
Age: 55
End: 2025-02-01

## (undated) DEVICE — SU VICRYL 4-0 PS-2 18" UND J496H

## (undated) DEVICE — GLOVE PROTEXIS BLUE W/NEU-THERA 6.5  2D73EB65

## (undated) DEVICE — ESU ELEC BLADE 2.75" COATED/INSULATED E1455

## (undated) DEVICE — LINEN POUCH DBL 5427

## (undated) DEVICE — ESU PENCIL W/HOLSTER E2350H

## (undated) DEVICE — SOL NACL 0.9% IRRIG 1000ML BOTTLE 07138-09

## (undated) DEVICE — GLOVE PROTEXIS W/NEU-THERA 6.5  2D73TE65

## (undated) DEVICE — ENDO TROCAR FIRST ENTRY KII FIOS Z-THRD 05X100MM CTF03

## (undated) DEVICE — ENDO TROCAR BLUNT TIP KII BALLOON 12X100MM C0R47

## (undated) DEVICE — LINEN FULL SHEET 5511

## (undated) DEVICE — Device

## (undated) DEVICE — ENDO TROCAR SLEEVE KII Z-THREADED 05X100MM CTS02

## (undated) DEVICE — SU VICRYL 0 UR-6 27" J603H

## (undated) DEVICE — LINEN HALF SHEET 5512

## (undated) DEVICE — LINEN TOWEL PACK X10 5473

## (undated) DEVICE — ESU GROUND PAD ADULT W/CORD E7507

## (undated) DEVICE — ESU CORD MONOPOLAR 10'  E0510

## (undated) DEVICE — ENDO POUCH UNIV RETRIEVAL SYSTEM INZII 10MM CD001

## (undated) DEVICE — BAG CLEAR TRASH 1.3M 39X33" P4040C

## (undated) DEVICE — CLIP APPLIER ENDO 5MM M/L LIGAMAX EL5ML

## (undated) RX ORDER — CELECOXIB 200 MG/1
CAPSULE ORAL
Status: DISPENSED
Start: 2020-06-17

## (undated) RX ORDER — ACETAMINOPHEN 325 MG/1
TABLET ORAL
Status: DISPENSED
Start: 2020-06-17

## (undated) RX ORDER — OXYCODONE HYDROCHLORIDE 5 MG/1
TABLET ORAL
Status: DISPENSED
Start: 2020-06-17

## (undated) RX ORDER — SCOLOPAMINE TRANSDERMAL SYSTEM 1 MG/1
PATCH, EXTENDED RELEASE TRANSDERMAL
Status: DISPENSED
Start: 2020-06-17

## (undated) RX ORDER — CEFAZOLIN SODIUM 2 G/100ML
INJECTION, SOLUTION INTRAVENOUS
Status: DISPENSED
Start: 2020-06-17

## (undated) RX ORDER — FENTANYL CITRATE 50 UG/ML
INJECTION, SOLUTION INTRAMUSCULAR; INTRAVENOUS
Status: DISPENSED
Start: 2020-06-17

## (undated) RX ORDER — BUPIVACAINE HYDROCHLORIDE 5 MG/ML
INJECTION, SOLUTION EPIDURAL; INTRACAUDAL
Status: DISPENSED
Start: 2020-06-17